# Patient Record
Sex: FEMALE | Race: WHITE | NOT HISPANIC OR LATINO | Employment: OTHER | URBAN - METROPOLITAN AREA
[De-identification: names, ages, dates, MRNs, and addresses within clinical notes are randomized per-mention and may not be internally consistent; named-entity substitution may affect disease eponyms.]

---

## 2019-02-04 ENCOUNTER — TELEPHONE (OUTPATIENT)
Dept: ENDOCRINOLOGY | Facility: HOSPITAL | Age: 79
End: 2019-02-04

## 2019-02-04 NOTE — TELEPHONE ENCOUNTER
Previous [facility] patient has appt with you on 2/13  She wants us to fax a lab slip to Dion [facility]ara in [facility]  She does not have the old one but said she see's you because she had part of her Thyroid removed  Do you know what labs you want to order?

## 2019-02-05 DIAGNOSIS — E89.0 POSTOPERATIVE HYPOTHYROIDISM: Primary | ICD-10-CM

## 2019-02-08 LAB
T4 FREE SERPL-MCNC: 1.82 NG/DL (ref 0.82–1.77)
THYROGLOB AB SERPL-ACNC: <1 IU/ML (ref 0–0.9)
THYROPEROXIDASE AB SERPL-ACNC: 22 IU/ML (ref 0–34)
TSH SERPL DL<=0.005 MIU/L-ACNC: 0.55 UIU/ML (ref 0.45–4.5)

## 2019-02-13 ENCOUNTER — OFFICE VISIT (OUTPATIENT)
Dept: ENDOCRINOLOGY | Facility: HOSPITAL | Age: 79
End: 2019-02-13
Payer: COMMERCIAL

## 2019-02-13 VITALS
BODY MASS INDEX: 47.09 KG/M2 | HEIGHT: 66 IN | DIASTOLIC BLOOD PRESSURE: 78 MMHG | WEIGHT: 293 LBS | SYSTOLIC BLOOD PRESSURE: 132 MMHG | HEART RATE: 54 BPM

## 2019-02-13 DIAGNOSIS — E89.0 POSTOPERATIVE HYPOTHYROIDISM: Primary | ICD-10-CM

## 2019-02-13 DIAGNOSIS — Z86.39 HISTORY OF THYROID NODULE: ICD-10-CM

## 2019-02-13 PROCEDURE — 99214 OFFICE O/P EST MOD 30 MIN: CPT | Performed by: INTERNAL MEDICINE

## 2019-02-13 RX ORDER — AMLODIPINE BESYLATE 2.5 MG/1
TABLET ORAL 2 TIMES DAILY
Refills: 3 | COMMUNITY
Start: 2019-01-01 | End: 2022-06-16

## 2019-02-13 RX ORDER — TRAMADOL HYDROCHLORIDE 50 MG/1
50 TABLET ORAL EVERY 6 HOURS PRN
COMMUNITY
End: 2021-11-04 | Stop reason: SDUPTHER

## 2019-02-13 RX ORDER — ATORVASTATIN CALCIUM 20 MG/1
20 TABLET, FILM COATED ORAL
Refills: 3 | COMMUNITY
Start: 2018-12-19

## 2019-02-13 RX ORDER — LISINOPRIL 20 MG/1
20 TABLET ORAL 2 TIMES DAILY
COMMUNITY
End: 2022-06-16

## 2019-02-13 RX ORDER — OMEPRAZOLE 20 MG/1
20 CAPSULE, DELAYED RELEASE ORAL DAILY
COMMUNITY
End: 2021-12-07

## 2019-02-13 RX ORDER — FUROSEMIDE 20 MG/1
20 TABLET ORAL DAILY
COMMUNITY
End: 2022-06-16

## 2019-02-13 RX ORDER — BISOPROLOL FUMARATE AND HYDROCHLOROTHIAZIDE 2.5; 6.25 MG/1; MG/1
1 TABLET ORAL DAILY
COMMUNITY
End: 2021-11-04

## 2019-02-13 RX ORDER — LEVOTHYROXINE SODIUM 137 MCG
137 TABLET ORAL DAILY
Refills: 4 | COMMUNITY
Start: 2018-12-25 | End: 2019-02-13 | Stop reason: ALTCHOICE

## 2019-02-13 RX ORDER — MONTELUKAST SODIUM 10 MG/1
10 TABLET ORAL
COMMUNITY
End: 2020-12-21

## 2019-02-13 RX ORDER — SPIRONOLACTONE 25 MG/1
25 TABLET ORAL DAILY
COMMUNITY
End: 2021-11-04

## 2019-02-13 RX ORDER — LEVOTHYROXINE SODIUM 137 UG/1
137 TABLET ORAL DAILY
Qty: 90 TABLET | Refills: 3 | Status: SHIPPED | OUTPATIENT
Start: 2019-02-13 | End: 2020-02-18 | Stop reason: SDUPTHER

## 2019-02-13 NOTE — PROGRESS NOTES
2/13/2019    Assessment/Plan      Diagnoses and all orders for this visit:    Postoperative hypothyroidism  -     TSH, 3rd generation Lab Collect; Future  -     T4, free Lab Collect; Future  -     levothyroxine 137 mcg tablet; Take 1 tablet (137 mcg total) by mouth daily    History of thyroid nodule  -     TSH, 3rd generation Lab Collect; Future  -     T4, free Lab Collect; Future  -     levothyroxine 137 mcg tablet; Take 1 tablet (137 mcg total) by mouth daily    Other orders  -     amLODIPine (NORVASC) 2 5 mg tablet; 1 (ONE) TABLET daily  -     aspirin 81 MG tablet; Take 81 mg by mouth daily  -     furosemide (LASIX) 20 mg tablet; Take 20 mg by mouth daily   -     omeprazole (PriLOSEC) 20 mg delayed release capsule; Take 20 mg by mouth daily  -     montelukast (SINGULAIR) 10 mg tablet; Take 10 mg by mouth daily at bedtime  -     spironolactone (ALDACTONE) 25 mg tablet; Take 25 mg by mouth daily  -     Discontinue: SYNTHROID 137 MCG tablet; Take 137 mcg by mouth daily  -     atorvastatin (LIPITOR) 10 mg tablet; Take 10 mg by mouth daily at bedtime  -     Oxygen-Helium (RES-Q-AIR IN); Inhale  -     traMADol (ULTRAM) 50 mg tablet; Take 50 mg by mouth every 6 (six) hours as needed for moderate pain  -     lisinopril (ZESTRIL) 20 mg tablet; Take 20 mg by mouth daily  -     bisoprolol-hydrochlorothiazide (ZIAC) 2 5-6 25 MG per tablet; Take 1 tablet by mouth daily        Assessment/Plan:  Hypothyroidism post thyroidectomy  Her most recent thyroid function tests are normal   She is biochemically euthyroid  She will continue the same dosage of thyroid hormone but will try switching to generic levothyroxine for cost issues and continue 137 mcg daily  She has a history of compressive thyroid symptoms due to her enlarged thyroid and thyroid nodules prior to the surgery and has no issues with swallowing at this point    I have asked her to follow up in 1 year with preceding TSH and free T4       CC:  Hypothyroid follow-up    History of Present Illness     HPI: Chandu Reis is a 69y o  year old female with history of hypothyroidism post subtotal thyroidectomy  She was found to have an enlarged goiter and thyroid nodules in 2008  She was having troubles with feeling choking sensation when bent over forward  The thyroid nodules were biopsy benign  She underwent subtotal thyroidectomy 04/10/2008 due to continued growth and compressive thyroid symptoms  She is taking brand synthroid 137 mcg daily  She does tend always be cold  She denies heat intolerance, tremors, anxiety or depression, or diarrhea  She does have chronic constipation  She has occasional heart racing  She denies insomnia but is fatigued a lot  She has lost 11 lb since November of 2017  She has dry skin and brittle nails, but no hair loss  She has no diplopia  She has no compressive thyroid symptoms or difficulties with swallowing  Review of Systems   Constitutional: Positive for fatigue  Negative for unexpected weight change  Can have fatigue at times depending on activity  11 lbs less than November 2017  HENT: Positive for tinnitus  Negative for hearing loss and trouble swallowing  Has a lot of sinus problems  No XRT to head or neck in the past  Occasional tinnitus  Eyes: Negative for visual disturbance  No diplopia  Wears reading glasses  Respiratory: Positive for shortness of breath  Negative for chest tightness  SOB with activity  Cardiovascular: Positive for palpitations  Negative for chest pain and leg swelling  Occasional heart racing  Gastrointestinal: Positive for constipation  Negative for abdominal pain, diarrhea and nausea  Endocrine: Positive for cold intolerance  Negative for heat intolerance  Musculoskeletal: Positive for back pain  Negative for arthralgias  Has chronic low back pain  Walks with a walker for stability  Skin: Negative for rash  Has dry skin   Has brittle nails  No hair loss  Neurological: Positive for numbness  Negative for dizziness, tremors, light-headedness and headaches  Has numbness of hands if hold an object too long  Psychiatric/Behavioral: Negative for dysphoric mood and sleep disturbance  The patient is not nervous/anxious  Sleeps in a chair at night  Depressing over youngest son's issues         Historical Information   Past Medical History:   Diagnosis Date    Asthma     GERD (gastroesophageal reflux disease)     Heart murmur     Hyperlipidemia     Hypertension     Osteoarthritis     Spinal stenosis      Past Surgical History:   Procedure Laterality Date    LAPAROSCOPIC CHOLECYSTECTOMY      REDUCTION MAMMAPLASTY      REPLACEMENT TOTAL KNEE BILATERAL Bilateral     THYROIDECTOMY  2008    TOTAL ABDOMINAL HYSTERECTOMY W/ BILATERAL SALPINGOOPHORECTOMY      with appendectomy    TOTAL HIP ARTHROPLASTY Bilateral     TUBAL LIGATION Bilateral      Social History   Social History     Substance and Sexual Activity   Alcohol Use Yes    Frequency: Monthly or less    Comment: once a year     Social History     Substance and Sexual Activity   Drug Use Never     Social History     Tobacco Use   Smoking Status Never Smoker   Smokeless Tobacco Never Used     Family History:   Family History   Problem Relation Age of Onset    Heart disease Mother     Diabetes type II Mother     Hypotension Father     Lung disease Father         from chemicals    Diabetes type II Brother     Lung cancer Daughter     Brain cancer Daughter     Kidney cancer Daughter     No Known Problems Son     Neurological problems Son    Soraida Marvmerry Anxiety disorder Son     Thyroid disease unspecified Daughter        Meds/Allergies   Current Outpatient Medications   Medication Sig Dispense Refill    amLODIPine (NORVASC) 2 5 mg tablet 1 (ONE) TABLET daily  3    aspirin 81 MG tablet Take 81 mg by mouth daily      atorvastatin (LIPITOR) 10 mg tablet Take 10 mg by mouth daily at bedtime  3    bisoprolol-hydrochlorothiazide (ZIAC) 2 5-6 25 MG per tablet Take 1 tablet by mouth daily      furosemide (LASIX) 20 mg tablet Take 20 mg by mouth daily       lisinopril (ZESTRIL) 20 mg tablet Take 20 mg by mouth daily      montelukast (SINGULAIR) 10 mg tablet Take 10 mg by mouth daily at bedtime      omeprazole (PriLOSEC) 20 mg delayed release capsule Take 20 mg by mouth daily      Oxygen-Helium (RES-Q-AIR IN) Inhale      spironolactone (ALDACTONE) 25 mg tablet Take 25 mg by mouth daily      traMADol (ULTRAM) 50 mg tablet Take 50 mg by mouth every 6 (six) hours as needed for moderate pain      levothyroxine 137 mcg tablet Take 1 tablet (137 mcg total) by mouth daily 90 tablet 3     No current facility-administered medications for this visit  No Known Allergies    Objective   Vitals: Blood pressure 132/78, pulse (!) 54, height 5' 6" (1 676 m), weight (!) 137 kg (301 lb 12 8 oz)  Invasive Devices          None          Physical Exam   Constitutional: She is oriented to person, place, and time  She appears well-developed and well-nourished  HENT:   Head: Normocephalic and atraumatic  Eyes: Pupils are equal, round, and reactive to light  Conjunctivae and EOM are normal    No lid lag, stare, proptosis, or periorbital edema  Neck: Normal range of motion  Neck supple  Healed anterior neck scar  No palpable thyroid tissue  No bruits over the carotids  Cardiovascular: Normal rate, regular rhythm and intact distal pulses  Murmur heard  2/6 systolic murmur  Pulmonary/Chest: Effort normal and breath sounds normal  She has no wheezes  Abdominal: Soft  Bowel sounds are normal  There is no tenderness  Musculoskeletal: Normal range of motion  She exhibits edema  She exhibits no deformity  Trace to 1+ edema  No tremor of the outstretched hands  No spinous process tenderness  No CVA tenderness  Lymphadenopathy:     She has no cervical adenopathy     Neurological: She is alert and oriented to person, place, and time  She has normal reflexes  Deep tendon reflexes normal without briskness  Skin: Skin is warm and dry  No rash noted  Vitals reviewed  The history was obtained from the review of the chart, [facility] notes, and from the patient  Lab Results:      Blood work done on 02/07/2019 demonstrates the following results:    Lab Results   Component Value Date    TSH 0 551 02/07/2019    FREET4 1 82 (H) 02/07/2019    Thyroid microsomal antibodies were 22  Antithyroglobulin antibodies are less than 1         Future Appointments   Date Time Provider Laura Montoya   2/18/2020  1:00 PM Veneda Bernheim, MD [facility]

## 2019-02-13 NOTE — PATIENT INSTRUCTIONS
The thyroid blood work is normal   Continue the same dose of thyroid hormone but switch to generic to save money  Follow up in 1 year with blood work

## 2019-02-13 NOTE — LETTER
February 13, 2019     Charlie Dominguez, [address] [facility]  [address] [address]    Patient: Gato Nava   YOB: 1940   Date of Visit: 2/13/2019       Dear Dr Lamont Andersen:    Thank you for referring Marshall Jamison to me for evaluation  Below are my notes for this consultation  If you have questions, please do not hesitate to call me  I look forward to following your patient along with you  Sincerely,        Mello Agarwal MD        CC: No Recipients  Mello Agarwal MD  2/13/2019  3:21 PM  Sign at close encounter  2/13/2019    Assessment/Plan      Diagnoses and all orders for this visit:    Postoperative hypothyroidism  -     TSH, 3rd generation Lab Collect; Future  -     T4, free Lab Collect; Future  -     levothyroxine 137 mcg tablet; Take 1 tablet (137 mcg total) by mouth daily    History of thyroid nodule  -     TSH, 3rd generation Lab Collect; Future  -     T4, free Lab Collect; Future  -     levothyroxine 137 mcg tablet; Take 1 tablet (137 mcg total) by mouth daily    Other orders  -     amLODIPine (NORVASC) 2 5 mg tablet; 1 (ONE) TABLET daily  -     aspirin 81 MG tablet; Take 81 mg by mouth daily  -     furosemide (LASIX) 20 mg tablet; Take 20 mg by mouth daily   -     omeprazole (PriLOSEC) 20 mg delayed release capsule; Take 20 mg by mouth daily  -     montelukast (SINGULAIR) 10 mg tablet; Take 10 mg by mouth daily at bedtime  -     spironolactone (ALDACTONE) 25 mg tablet; Take 25 mg by mouth daily  -     Discontinue: SYNTHROID 137 MCG tablet; Take 137 mcg by mouth daily  -     atorvastatin (LIPITOR) 10 mg tablet; Take 10 mg by mouth daily at bedtime  -     Oxygen-Helium (RES-Q-AIR IN); Inhale  -     traMADol (ULTRAM) 50 mg tablet; Take 50 mg by mouth every 6 (six) hours as needed for moderate pain  -     lisinopril (ZESTRIL) 20 mg tablet; Take 20 mg by mouth daily  -     bisoprolol-hydrochlorothiazide (ZIAC) 2 5-6 25 MG per tablet;  Take 1 tablet by mouth daily        Assessment/Plan:  Hypothyroidism post thyroidectomy  Her most recent thyroid function tests are normal   She is biochemically euthyroid  She will continue the same dosage of thyroid hormone but will try switching to generic levothyroxine for cost issues and continue 137 mcg daily  She has a history of compressive thyroid symptoms due to her enlarged thyroid and thyroid nodules prior to the surgery and has no issues with swallowing at this point  I have asked her to follow up in 1 year with preceding TSH and free T4       CC:  Hypothyroid follow-up    History of Present Illness     HPI: Alireza Alexander is a 69y o  year old female with history of hypothyroidism post subtotal thyroidectomy  She was found to have an enlarged goiter and thyroid nodules in 2008  She was having troubles with feeling choking sensation when bent over forward  The thyroid nodules were biopsy benign  She underwent subtotal thyroidectomy 04/10/2008 due to continued growth and compressive thyroid symptoms  She is taking brand synthroid 137 mcg daily  She does tend always be cold  She denies heat intolerance, tremors, anxiety or depression, or diarrhea  She does have chronic constipation  She has occasional heart racing  She denies insomnia but is fatigued a lot  She has lost 11 lb since November of 2017  She has dry skin and brittle nails, but no hair loss  She has no diplopia  She has no compressive thyroid symptoms or difficulties with swallowing  Review of Systems   Constitutional: Positive for fatigue  Negative for unexpected weight change  Can have fatigue at times depending on activity  11 lbs less than November 2017  HENT: Positive for tinnitus  Negative for hearing loss and trouble swallowing  Has a lot of sinus problems  No XRT to head or neck in the past  Occasional tinnitus  Eyes: Negative for visual disturbance  No diplopia  Wears reading glasses     Respiratory: Positive for shortness of breath  Negative for chest tightness  SOB with activity  Cardiovascular: Positive for palpitations  Negative for chest pain and leg swelling  Occasional heart racing  Gastrointestinal: Positive for constipation  Negative for abdominal pain, diarrhea and nausea  Endocrine: Positive for cold intolerance  Negative for heat intolerance  Musculoskeletal: Positive for back pain  Negative for arthralgias  Has chronic low back pain  Walks with a walker for stability  Skin: Negative for rash  Has dry skin  Has brittle nails  No hair loss  Neurological: Positive for numbness  Negative for dizziness, tremors, light-headedness and headaches  Has numbness of hands if hold an object too long  Psychiatric/Behavioral: Negative for dysphoric mood and sleep disturbance  The patient is not nervous/anxious  Sleeps in a chair at night  Depressing over youngest son's issues         Historical Information   Past Medical History:   Diagnosis Date    Asthma     GERD (gastroesophageal reflux disease)     Heart murmur     Hyperlipidemia     Hypertension     Osteoarthritis     Spinal stenosis      Past Surgical History:   Procedure Laterality Date    LAPAROSCOPIC CHOLECYSTECTOMY      REDUCTION MAMMAPLASTY      REPLACEMENT TOTAL KNEE BILATERAL Bilateral     THYROIDECTOMY  2008    TOTAL ABDOMINAL HYSTERECTOMY W/ BILATERAL SALPINGOOPHORECTOMY      with appendectomy    TOTAL HIP ARTHROPLASTY Bilateral     TUBAL LIGATION Bilateral      Social History   Social History     Substance and Sexual Activity   Alcohol Use Yes    Frequency: Monthly or less    Comment: once a year     Social History     Substance and Sexual Activity   Drug Use Never     Social History     Tobacco Use   Smoking Status Never Smoker   Smokeless Tobacco Never Used     Family History:   Family History   Problem Relation Age of Onset    Heart disease Mother     Diabetes type II Mother    Nicky Belrupesh Hypotension Father     Lung disease Father         from chemicals    Diabetes type II Brother     Lung cancer Daughter     Brain cancer Daughter     Kidney cancer Daughter     No Known Problems Son     Neurological problems Son    Luis M Bell Anxiety disorder Son     Thyroid disease unspecified Daughter        Meds/Allergies   Current Outpatient Medications   Medication Sig Dispense Refill    amLODIPine (NORVASC) 2 5 mg tablet 1 (ONE) TABLET daily  3    aspirin 81 MG tablet Take 81 mg by mouth daily      atorvastatin (LIPITOR) 10 mg tablet Take 10 mg by mouth daily at bedtime  3    bisoprolol-hydrochlorothiazide (ZIAC) 2 5-6 25 MG per tablet Take 1 tablet by mouth daily      furosemide (LASIX) 20 mg tablet Take 20 mg by mouth daily       lisinopril (ZESTRIL) 20 mg tablet Take 20 mg by mouth daily      montelukast (SINGULAIR) 10 mg tablet Take 10 mg by mouth daily at bedtime      omeprazole (PriLOSEC) 20 mg delayed release capsule Take 20 mg by mouth daily      Oxygen-Helium (RES-Q-AIR IN) Inhale      spironolactone (ALDACTONE) 25 mg tablet Take 25 mg by mouth daily      traMADol (ULTRAM) 50 mg tablet Take 50 mg by mouth every 6 (six) hours as needed for moderate pain      levothyroxine 137 mcg tablet Take 1 tablet (137 mcg total) by mouth daily 90 tablet 3     No current facility-administered medications for this visit  No Known Allergies    Objective   Vitals: Blood pressure 132/78, pulse (!) 54, height 5' 6" (1 676 m), weight (!) 137 kg (301 lb 12 8 oz)  Invasive Devices          None          Physical Exam   Constitutional: She is oriented to person, place, and time  She appears well-developed and well-nourished  HENT:   Head: Normocephalic and atraumatic  Eyes: Pupils are equal, round, and reactive to light  Conjunctivae and EOM are normal    No lid lag, stare, proptosis, or periorbital edema  Neck: Normal range of motion  Neck supple  Healed anterior neck scar    No palpable thyroid tissue  No bruits over the carotids  Cardiovascular: Normal rate, regular rhythm and intact distal pulses  Murmur heard  2/6 systolic murmur  Pulmonary/Chest: Effort normal and breath sounds normal  She has no wheezes  Abdominal: Soft  Bowel sounds are normal  There is no tenderness  Musculoskeletal: Normal range of motion  She exhibits edema  She exhibits no deformity  Trace to 1+ edema  No tremor of the outstretched hands  No spinous process tenderness  No CVA tenderness  Lymphadenopathy:     She has no cervical adenopathy  Neurological: She is alert and oriented to person, place, and time  She has normal reflexes  Deep tendon reflexes normal without briskness  Skin: Skin is warm and dry  No rash noted  Vitals reviewed  The history was obtained from the review of the chart, [facility] notes, and from the patient  Lab Results:      Blood work done on 02/07/2019 demonstrates the following results:    Lab Results   Component Value Date    TSH 0 551 02/07/2019    FREET4 1 82 (H) 02/07/2019    Thyroid microsomal antibodies were 22  Antithyroglobulin antibodies are less than 1         Future Appointments   Date Time Provider Laura Montoya   2/18/2020  1:00 PM Martin Cartwright MD [facility]

## 2019-04-02 DIAGNOSIS — E89.0 POSTOPERATIVE HYPOTHYROIDISM: ICD-10-CM

## 2019-04-02 DIAGNOSIS — Z86.39 HISTORY OF THYROID NODULE: ICD-10-CM

## 2019-04-02 RX ORDER — LEVOTHYROXINE SODIUM 137 MCG
TABLET ORAL
Qty: 90 TABLET | Refills: 4 | OUTPATIENT
Start: 2019-04-02

## 2020-02-14 LAB
T4 FREE SERPL-MCNC: 1.9 NG/DL (ref 0.82–1.77)
TSH SERPL DL<=0.005 MIU/L-ACNC: 1.67 UIU/ML (ref 0.45–4.5)

## 2020-02-18 ENCOUNTER — OFFICE VISIT (OUTPATIENT)
Dept: ENDOCRINOLOGY | Facility: HOSPITAL | Age: 80
End: 2020-02-18
Payer: COMMERCIAL

## 2020-02-18 VITALS
HEIGHT: 66 IN | BODY MASS INDEX: 47.09 KG/M2 | HEART RATE: 64 BPM | SYSTOLIC BLOOD PRESSURE: 132 MMHG | WEIGHT: 293 LBS | DIASTOLIC BLOOD PRESSURE: 80 MMHG

## 2020-02-18 DIAGNOSIS — Z86.39 HISTORY OF THYROID NODULE: ICD-10-CM

## 2020-02-18 DIAGNOSIS — E89.0 POSTOPERATIVE HYPOTHYROIDISM: Primary | ICD-10-CM

## 2020-02-18 PROCEDURE — 99213 OFFICE O/P EST LOW 20 MIN: CPT | Performed by: INTERNAL MEDICINE

## 2020-02-18 RX ORDER — LEVOTHYROXINE SODIUM 137 UG/1
137 TABLET ORAL DAILY
Qty: 90 TABLET | Refills: 3 | Status: SHIPPED | OUTPATIENT
Start: 2020-02-18 | End: 2021-02-15 | Stop reason: SDUPTHER

## 2020-02-18 NOTE — PROGRESS NOTES
2/18/2020    Assessment/Plan      Diagnoses and all orders for this visit:    Postoperative hypothyroidism  -     TSH, 3rd generation Lab Collect; Future  -     T4, free Lab Collect; Future  -     levothyroxine 137 mcg tablet; Take 1 tablet (137 mcg total) by mouth daily    History of thyroid nodule  -     levothyroxine 137 mcg tablet; Take 1 tablet (137 mcg total) by mouth daily    Other orders  -     Omega-3 Fatty Acids (FISH OIL CONCENTRATE PO); Take 1,250 mg by mouth 2 (two) times a day Res q 1250 fish oil 2 tablets twice a day  Assessment/Plan:  Hypothyroidism post subtotal thyroidectomy  She has a history of thyroid nodules requiring removal via subtotal thyroidectomy  Most recent thyroid function tests are normal   She is both biochemically and clinically euthyroid  She will continue the same levothyroxine 137 mcg daily  She has been tolerating generic thyroid hormone without any change in symptoms  I have asked her to follow up in 1 year with preceding TSH and free T4       CC:  Hypothyroid follow-up    History of Present Illness     HPI: Jacque Munroe is a 67y o  year old female with history of hypothyroidism post subtotal thyroidectomy here for follow-up  She was found to have an enlarged goiter in thyroid nodules in 2008  She was having a choking sensation in her throat when she bent forward  Thyroid biopsies were benign but she underwent subtotal thyroidectomy April 2008 due to compressive thyroid symptoms and continued growth of the thyroid nodules  She has been on thyroid hormone replacement ever since  She is currently taking levothyroxine 137 mcg 1 tablet daily  She feels no different in symptoms with switch to generic  She denies heat or cold intolerance, anxiety or depression, diarrhea, palpitation, or tremors  She has some constipation  She is fatigued  She has dry skin but no brittle nails or hair loss  She has some intermittent insomnia  She denies diplopia    Weight is stable  She has no compressive thyroid symptoms or difficulties with swallowing  Review of Systems   Constitutional: Positive for fatigue  Negative for unexpected weight change  HENT: Negative for trouble swallowing  Eyes: Negative for visual disturbance  No diplopia  Respiratory: Positive for shortness of breath  Negative for chest tightness  SOB with activity  Cardiovascular: Negative for chest pain and palpitations  Gastrointestinal: Positive for constipation  Negative for abdominal pain, diarrhea and nausea  Some recent constipation  Endocrine: Negative for cold intolerance and heat intolerance  Musculoskeletal: Positive for back pain  Still a lot of back pain and uses PRN CBD oil and hemp  Has had a fall and is worse  Skin: Negative for rash  Has dry skin  No brittle nails  No hair loss  Neurological: Negative for dizziness, tremors, light-headedness and headaches  Psychiatric/Behavioral: Positive for sleep disturbance  Negative for dysphoric mood  The patient is not nervous/anxious  Sleeping variable         Historical Information   Past Medical History:   Diagnosis Date    Asthma     GERD (gastroesophageal reflux disease)     Heart murmur     Hyperlipidemia     Hypertension     Osteoarthritis     Spinal stenosis      Past Surgical History:   Procedure Laterality Date    LAPAROSCOPIC CHOLECYSTECTOMY      REDUCTION MAMMAPLASTY      REPLACEMENT TOTAL KNEE BILATERAL Bilateral     THYROIDECTOMY  2008    TOTAL ABDOMINAL HYSTERECTOMY W/ BILATERAL SALPINGOOPHORECTOMY      with appendectomy    TOTAL HIP ARTHROPLASTY Bilateral     TUBAL LIGATION Bilateral      Social History   Social History     Substance and Sexual Activity   Alcohol Use Yes    Frequency: Monthly or less    Comment: once a year     Social History     Substance and Sexual Activity   Drug Use Never    Comment: CBD oil and hemp PRn      Social History     Tobacco Use Smoking Status Never Smoker   Smokeless Tobacco Never Used     Family History:   Family History   Problem Relation Age of Onset    Heart disease Mother     Diabetes type II Mother     Hypotension Father     Lung disease Father         from chemicals    Diabetes type II Brother     Kidney failure Brother     Lung cancer Daughter     Brain cancer Daughter     Kidney cancer Daughter     No Known Problems Son     Neurological problems Son     Anxiety disorder Son     Thyroid disease unspecified Daughter        Meds/Allergies   Current Outpatient Medications   Medication Sig Dispense Refill    amLODIPine (NORVASC) 2 5 mg tablet 1 (ONE) TABLET daily  3    aspirin 81 MG tablet Take 81 mg by mouth daily      atorvastatin (LIPITOR) 10 mg tablet Take 10 mg by mouth daily at bedtime  3    bisoprolol-hydrochlorothiazide (ZIAC) 2 5-6 25 MG per tablet Take 1 tablet by mouth daily      furosemide (LASIX) 20 mg tablet Take 20 mg by mouth daily       levothyroxine 137 mcg tablet Take 1 tablet (137 mcg total) by mouth daily 90 tablet 3    lisinopril (ZESTRIL) 20 mg tablet Take 20 mg by mouth daily      montelukast (SINGULAIR) 10 mg tablet Take 10 mg by mouth daily at bedtime      Omega-3 Fatty Acids (FISH OIL CONCENTRATE PO) Take 1,250 mg by mouth 2 (two) times a day Res q 1250 fish oil 2 tablets twice a day   omeprazole (PriLOSEC) 20 mg delayed release capsule Take 20 mg by mouth daily      spironolactone (ALDACTONE) 25 mg tablet Take 25 mg by mouth daily      traMADol (ULTRAM) 50 mg tablet Take 50 mg by mouth every 6 (six) hours as needed for moderate pain       No current facility-administered medications for this visit  No Known Allergies    Objective   Vitals: Blood pressure 132/80, pulse 64, height 5' 6" (1 676 m), weight (!) 137 kg (301 lb 9 6 oz)  Invasive Devices     None                 Physical Exam   Constitutional: She is oriented to person, place, and time   She appears well-developed and well-nourished  HENT:   Head: Normocephalic and atraumatic  Eyes: Pupils are equal, round, and reactive to light  Conjunctivae and EOM are normal    No lid lag, stare, proptosis, or periorbital edema  Neck: Normal range of motion  Neck supple  Healed anterior neck scar  No palpable thyroid tissue  No carotid bruits  Cardiovascular: Normal rate and regular rhythm  No murmur heard  2/6 systolic heart murmur  Pulmonary/Chest: Effort normal and breath sounds normal  She has no wheezes  Musculoskeletal: She exhibits edema  She exhibits no deformity  1-2+ edema of the bilateral lower extremities  No tremor of the outstretched hands  Lymphadenopathy:     She has no cervical adenopathy  Neurological: She is alert and oriented to person, place, and time  She has normal reflexes  Diminished deep tendon reflexes  Skin: Skin is warm and dry  No rash noted  Vitals reviewed  The history was obtained from the review of the chart and from the patient      Lab Results:      Blood work done on 02/13/2020 demonstrated the following results:    Lab Results   Component Value Date    TSH 1 670 02/13/2020    FREET4 1 90 (H) 02/13/2020       Future Appointments   Date Time Provider Laura Montoya   2/22/2021  1:00 PM Rajat Alexander MD [facility]

## 2020-02-18 NOTE — PATIENT INSTRUCTIONS
The thyroid blood work is normal   Continue the same levothyroxine 137 mcg daily  follow up in 1 year with blood work

## 2020-12-21 DIAGNOSIS — J45.40 MODERATE PERSISTENT ASTHMA WITHOUT COMPLICATION: Primary | ICD-10-CM

## 2020-12-21 RX ORDER — MONTELUKAST SODIUM 10 MG/1
TABLET ORAL
Qty: 90 TABLET | Refills: 3 | Status: SHIPPED | OUTPATIENT
Start: 2020-12-21 | End: 2021-11-04 | Stop reason: SDUPTHER

## 2021-02-15 ENCOUNTER — TELEPHONE (OUTPATIENT)
Dept: ENDOCRINOLOGY | Facility: HOSPITAL | Age: 81
End: 2021-02-15

## 2021-02-15 DIAGNOSIS — Z86.39 HISTORY OF THYROID NODULE: ICD-10-CM

## 2021-02-15 DIAGNOSIS — E89.0 POSTOPERATIVE HYPOTHYROIDISM: ICD-10-CM

## 2021-02-15 RX ORDER — LEVOTHYROXINE SODIUM 137 UG/1
137 TABLET ORAL DAILY
Qty: 30 TABLET | Refills: 3 | Status: SHIPPED | OUTPATIENT
Start: 2021-02-15 | End: 2021-02-18

## 2021-02-15 NOTE — TELEPHONE ENCOUNTER
Pt rescheduled 2/22 to 4/7  She had death in family & not able to get labs done   She needs renewal of levothyroxine/ 137mcg/ once daily/ she would like just a 30 day supply/ send to [facility] #[id number]

## 2021-02-18 DIAGNOSIS — E89.0 POSTOPERATIVE HYPOTHYROIDISM: ICD-10-CM

## 2021-02-18 DIAGNOSIS — Z86.39 HISTORY OF THYROID NODULE: ICD-10-CM

## 2021-02-18 RX ORDER — LEVOTHYROXINE SODIUM 137 UG/1
TABLET ORAL
Qty: 90 TABLET | Refills: 3 | Status: SHIPPED | OUTPATIENT
Start: 2021-02-18 | End: 2022-01-24

## 2021-03-25 LAB
T4 FREE SERPL-MCNC: 1.89 NG/DL (ref 0.82–1.77)
TSH SERPL DL<=0.005 MIU/L-ACNC: 1.48 UIU/ML (ref 0.45–4.5)

## 2021-04-07 ENCOUNTER — OFFICE VISIT (OUTPATIENT)
Dept: ENDOCRINOLOGY | Facility: HOSPITAL | Age: 81
End: 2021-04-07
Payer: COMMERCIAL

## 2021-04-07 VITALS
HEART RATE: 60 BPM | HEIGHT: 66 IN | BODY MASS INDEX: 47.09 KG/M2 | WEIGHT: 293 LBS | SYSTOLIC BLOOD PRESSURE: 144 MMHG | DIASTOLIC BLOOD PRESSURE: 78 MMHG

## 2021-04-07 DIAGNOSIS — Z86.39 HISTORY OF THYROID NODULE: ICD-10-CM

## 2021-04-07 DIAGNOSIS — E89.0 POSTOPERATIVE HYPOTHYROIDISM: Primary | ICD-10-CM

## 2021-04-07 PROCEDURE — 99213 OFFICE O/P EST LOW 20 MIN: CPT | Performed by: INTERNAL MEDICINE

## 2021-04-07 RX ORDER — METOPROLOL SUCCINATE 25 MG/1
25 TABLET, EXTENDED RELEASE ORAL DAILY
COMMUNITY

## 2021-04-07 NOTE — PROGRESS NOTES
4/7/2021    Assessment/Plan      Diagnoses and all orders for this visit:    Postoperative hypothyroidism  -     TSH, 3rd generation Lab Collect; Future  -     T4, free Lab Collect; Future    History of thyroid nodule  -     TSH, 3rd generation Lab Collect; Future  -     T4, free Lab Collect; Future    Other orders  -     Oxygen-Helium (RES-Q-AIR IN); Inhale  -     metoprolol succinate (TOPROL-XL) 25 mg 24 hr tablet; Take 25 mg by mouth daily        Assessment/Plan:    Hypothyroidism post thyroidectomy  Most recent thyroid function tests are normal   She is both clinically and biochemically euthyroid  She will continue the same levothyroxine 137 mcg daily  I have asked her to follow up in 1 year with preceding TSH and free T4       CC:   Hypothyroid follow-up    History of Present Illness     HPI: Deyanira Gasca is a 79y o  year old female with history of Hypothyroidism post subtotal thyroidectomy here for follow-up visit  She was found to have a large goiter in and thyroid nodules in 2008  Since she was having choking sensation in her throat when she bent followed, surgery was performed  Of note thyroid nodule biopsies done via fine-needle aspiration were benign  Subtotal Thyroidectomy was done in April 2008 due to compressive thyroid symptoms and continued growth of the thyroid nodules  She has been on thyroid hormone for replacement purposes as the pathology postoperatively was benign  She is currently taking levothyroxine 137 mcg daily  Weight is 15 lb more than  Last year  She though feels as if she has lost weight and not gained weight since last year  She is fatigued  She has feelings of being cold all the time  Sleeping was poor when her son was alive but has improved since he has passed away although she is more emotional since he has passed  She has dry skin but no brittle nails or hair loss  She has worsening tremors  She denies palpitations    She has constipation at times but no diarrhea  She denies diplopia  She denies compressive thyroid symptoms or difficulties with swallowing  Review of Systems   Constitutional: Positive for fatigue and unexpected weight change  Weight gain 15 lb since last year  Feels lost weight,not gained  HENT: Negative for trouble swallowing  Eyes: Negative for visual disturbance  No diplopia  Respiratory: Positive for shortness of breath  Negative for chest tightness  SOB with activity, this is worsening  Cardiovascular: Negative for chest pain and palpitations  Has aortic valve narrowing more and to have repeat echo in sept 2021  Gastrointestinal: Positive for constipation  Negative for abdominal pain, diarrhea and nausea  Constipation at times  Endocrine: Positive for cold intolerance  Negative for heat intolerance  Skin: Negative for rash  Has dry skin  No brittle nails  No hair loss  Neurological: Positive for tremors  Negative for dizziness, light-headedness and headaches  Some worsening tremors  occasional headaches  Psychiatric/Behavioral: Positive for sleep disturbance  Negative for dysphoric mood  The patient is not nervous/anxious  Sleeping was poor when son was alive, it is improving now since he passed 2/14/2021  Dealing with emotions with sons death and estate         Historical Information   Past Medical History:   Diagnosis Date    Aortic stenosis     Asthma     GERD (gastroesophageal reflux disease)     Heart murmur     Hyperlipidemia     Hypertension     Osteoarthritis     Spinal stenosis      Past Surgical History:   Procedure Laterality Date    LAPAROSCOPIC CHOLECYSTECTOMY      REDUCTION MAMMAPLASTY      REPLACEMENT TOTAL KNEE BILATERAL Bilateral     THYROIDECTOMY  2008    TOTAL ABDOMINAL HYSTERECTOMY W/ BILATERAL SALPINGOOPHORECTOMY      with appendectomy    TOTAL HIP ARTHROPLASTY Bilateral     TUBAL LIGATION Bilateral      Social History   Social History     Substance and Sexual Activity   Alcohol Use Yes    Frequency: Monthly or less    Comment: once a year     Social History     Substance and Sexual Activity   Drug Use Never    Comment: CBD oil and hemp PRn      Social History     Tobacco Use   Smoking Status Never Smoker   Smokeless Tobacco Never Used     Family History:   Family History   Problem Relation Age of Onset    Heart disease Mother     Diabetes type II Mother     Hypotension Father     Lung disease Father         from chemicals    Diabetes type II Brother     Kidney failure Brother     Lung cancer Daughter     Brain cancer Daughter     Kidney cancer Daughter     No Known Problems Son     Neurological problems Son     Anxiety disorder Son     Thyroid disease unspecified Daughter        Meds/Allergies   Current Outpatient Medications   Medication Sig Dispense Refill    amLODIPine (NORVASC) 2 5 mg tablet 2 (two) times a day   3    aspirin 81 MG tablet Take 81 mg by mouth daily      atorvastatin (LIPITOR) 20 mg tablet Take 20 mg by mouth daily at bedtime   3    furosemide (LASIX) 20 mg tablet Take 20 mg by mouth daily       levothyroxine 137 mcg tablet TAKE 1 TABLET BY MOUTH EVERY DAY 90 tablet 3    lisinopril (ZESTRIL) 20 mg tablet Take 20 mg by mouth 2 (two) times a day       metoprolol succinate (TOPROL-XL) 25 mg 24 hr tablet Take 25 mg by mouth daily      montelukast (SINGULAIR) 10 mg tablet TAKE 1 TABLET BY MOUTH EVERY DAY 90 tablet 3    Omega-3 Fatty Acids (FISH OIL CONCENTRATE PO) Take 1,250 mg by mouth 2 (two) times a day Res q 1250 fish oil 2 tablets twice a day        omeprazole (PriLOSEC) 20 mg delayed release capsule Take 20 mg by mouth daily      Oxygen-Helium (RES-Q-AIR IN) Inhale      spironolactone (ALDACTONE) 25 mg tablet Take 25 mg by mouth daily      bisoprolol-hydrochlorothiazide (ZIAC) 2 5-6 25 MG per tablet Take 1 tablet by mouth daily      traMADol (ULTRAM) 50 mg tablet Take 50 mg by mouth every 6 (six) hours as needed for moderate pain       No current facility-administered medications for this visit  No Known Allergies    Objective   Vitals: Blood pressure 144/78, pulse 60, height 5' 6" (1 676 m), weight (!) 143 kg (316 lb 3 2 oz)  Invasive Devices     None                 Physical Exam  Vitals signs reviewed  Constitutional:       Appearance: Normal appearance  She is well-developed  HENT:      Head: Normocephalic and atraumatic  Eyes:      Extraocular Movements: Extraocular movements intact  Conjunctiva/sclera: Conjunctivae normal       Comments: No lid lag, stare, proptosis, or periorbital edema  Neck:      Musculoskeletal: Normal range of motion and neck supple  Thyroid: No thyromegaly  Vascular: No carotid bruit  Comments: Healed anterior neck scar  No palpable thyroid tissue  Cardiovascular:      Rate and Rhythm: Normal rate and regular rhythm  Heart sounds: Normal heart sounds  No murmur  Comments: 2/6 Blowing holosystolic murmur with radiation to the carotids  Pulmonary:      Effort: Pulmonary effort is normal       Breath sounds: Normal breath sounds  No wheezing  Abdominal:      Palpations: Abdomen is soft  Musculoskeletal: Normal range of motion  General: No deformity  Right lower leg: Edema present  Left lower leg: Edema present  Comments: 1-2+ Bilateral lower extremity edema  Slight fine tremor of the outstretched hands  Lymphadenopathy:      Cervical: No cervical adenopathy  Skin:     General: Skin is warm and dry  Findings: No rash  Neurological:      Mental Status: She is alert and oriented to person, place, and time  Deep Tendon Reflexes: Reflexes are normal and symmetric  Comments: Deep tendon reflexes diminished at the patellar area  The history was obtained from the review of the chart and from the patient      Lab Results:      Blood work done on 3/24/2021 demonstrated the following results:    Lab Results   Component Value Date    TSH 1 480 03/24/2021    FREET4 1 89 (H) 03/24/2021       Future Appointments   Date Time Provider Laura Montoya   4/11/2022  2:20 PM Celia Tong MD [facility]

## 2021-04-07 NOTE — PATIENT INSTRUCTIONS
The thyroid blood work is good  Continue the same levothyroxine 137 mcg daily  Follow up in 1 year with blood work

## 2021-11-04 ENCOUNTER — OFFICE VISIT (OUTPATIENT)
Dept: INTERNAL MEDICINE CLINIC | Facility: CLINIC | Age: 81
End: 2021-11-04
Payer: COMMERCIAL

## 2021-11-04 VITALS
SYSTOLIC BLOOD PRESSURE: 126 MMHG | HEIGHT: 66 IN | HEART RATE: 70 BPM | DIASTOLIC BLOOD PRESSURE: 70 MMHG | BODY MASS INDEX: 47.09 KG/M2 | TEMPERATURE: 97 F | OXYGEN SATURATION: 94 % | WEIGHT: 293 LBS

## 2021-11-04 DIAGNOSIS — J45.40 MODERATE PERSISTENT ASTHMA WITHOUT COMPLICATION: ICD-10-CM

## 2021-11-04 DIAGNOSIS — J45.30 MILD PERSISTENT ASTHMA WITHOUT COMPLICATION: Primary | ICD-10-CM

## 2021-11-04 DIAGNOSIS — I35.0 AORTIC VALVE STENOSIS, ETIOLOGY OF CARDIAC VALVE DISEASE UNSPECIFIED: ICD-10-CM

## 2021-11-04 DIAGNOSIS — M54.50 CHRONIC BILATERAL LOW BACK PAIN WITHOUT SCIATICA: ICD-10-CM

## 2021-11-04 DIAGNOSIS — M62.830 LUMBAR PARASPINAL MUSCLE SPASM: ICD-10-CM

## 2021-11-04 DIAGNOSIS — G89.29 CHRONIC BILATERAL LOW BACK PAIN WITHOUT SCIATICA: ICD-10-CM

## 2021-11-04 DIAGNOSIS — E66.01 OBESITY, MORBID (HCC): ICD-10-CM

## 2021-11-04 DIAGNOSIS — E66.2 PICKWICKIAN SYNDROME (HCC): ICD-10-CM

## 2021-11-04 PROCEDURE — 1036F TOBACCO NON-USER: CPT | Performed by: INTERNAL MEDICINE

## 2021-11-04 PROCEDURE — 3725F SCREEN DEPRESSION PERFORMED: CPT | Performed by: INTERNAL MEDICINE

## 2021-11-04 PROCEDURE — 1160F RVW MEDS BY RX/DR IN RCRD: CPT | Performed by: INTERNAL MEDICINE

## 2021-11-04 PROCEDURE — 99214 OFFICE O/P EST MOD 30 MIN: CPT | Performed by: INTERNAL MEDICINE

## 2021-11-04 RX ORDER — TRAMADOL HYDROCHLORIDE 50 MG/1
50 TABLET ORAL 2 TIMES DAILY PRN
Qty: 60 TABLET | Refills: 2 | Status: SHIPPED | OUTPATIENT
Start: 2021-11-04 | End: 2021-12-04

## 2021-11-04 RX ORDER — MONTELUKAST SODIUM 10 MG/1
10 TABLET ORAL DAILY
Qty: 90 TABLET | Refills: 3 | Status: SHIPPED | OUTPATIENT
Start: 2021-11-04 | End: 2022-03-07 | Stop reason: SDUPTHER

## 2021-12-07 DIAGNOSIS — K21.9 GASTROESOPHAGEAL REFLUX DISEASE WITHOUT ESOPHAGITIS: Primary | ICD-10-CM

## 2021-12-07 RX ORDER — OMEPRAZOLE 20 MG/1
CAPSULE, DELAYED RELEASE ORAL
Qty: 90 CAPSULE | Refills: 3 | Status: SHIPPED | OUTPATIENT
Start: 2021-12-07 | End: 2022-06-16 | Stop reason: ALTCHOICE

## 2022-01-23 DIAGNOSIS — E89.0 POSTOPERATIVE HYPOTHYROIDISM: ICD-10-CM

## 2022-01-23 DIAGNOSIS — Z86.39 HISTORY OF THYROID NODULE: ICD-10-CM

## 2022-01-24 RX ORDER — LEVOTHYROXINE SODIUM 137 UG/1
TABLET ORAL
Qty: 90 TABLET | Refills: 3 | Status: SHIPPED | OUTPATIENT
Start: 2022-01-24

## 2022-03-07 DIAGNOSIS — J45.40 MODERATE PERSISTENT ASTHMA WITHOUT COMPLICATION: ICD-10-CM

## 2022-03-07 RX ORDER — MONTELUKAST SODIUM 10 MG/1
10 TABLET ORAL DAILY
Qty: 90 TABLET | Refills: 3 | Status: SHIPPED | OUTPATIENT
Start: 2022-03-07 | End: 2022-03-08 | Stop reason: SDUPTHER

## 2022-03-08 ENCOUNTER — TELEMEDICINE (OUTPATIENT)
Dept: INTERNAL MEDICINE CLINIC | Facility: CLINIC | Age: 82
End: 2022-03-08
Payer: COMMERCIAL

## 2022-03-08 DIAGNOSIS — Z29.8 SBE (SUBACUTE BACTERIAL ENDOCARDITIS) PROPHYLAXIS CANDIDATE: ICD-10-CM

## 2022-03-08 DIAGNOSIS — J45.40 MODERATE PERSISTENT ASTHMA WITHOUT COMPLICATION: ICD-10-CM

## 2022-03-08 DIAGNOSIS — F41.9 ANXIETY: ICD-10-CM

## 2022-03-08 DIAGNOSIS — J01.01 ACUTE RECURRENT MAXILLARY SINUSITIS: Primary | ICD-10-CM

## 2022-03-08 PROCEDURE — 99441 PR PHYS/QHP TELEPHONE EVALUATION 5-10 MIN: CPT | Performed by: INTERNAL MEDICINE

## 2022-03-08 RX ORDER — LORAZEPAM 1 MG/1
1 TABLET ORAL AS NEEDED
Qty: 1 TABLET | Refills: 1 | Status: SHIPPED | OUTPATIENT
Start: 2022-03-08 | End: 2022-06-16

## 2022-03-08 RX ORDER — AMOXICILLIN 500 MG/1
2000 CAPSULE ORAL AS NEEDED
Qty: 16 CAPSULE | Refills: 0 | Status: SHIPPED | OUTPATIENT
Start: 2022-03-08 | End: 2022-03-12

## 2022-03-08 RX ORDER — MONTELUKAST SODIUM 10 MG/1
10 TABLET ORAL DAILY
Qty: 90 TABLET | Refills: 3 | Status: SHIPPED | OUTPATIENT
Start: 2022-03-08 | End: 2022-06-16

## 2022-03-08 NOTE — PROGRESS NOTES
Virtual Brief Visit    Patient is located in the following state in which I hold an active license [place]      Assessment/Plan:    Problem List Items Addressed This Visit        Respiratory    Acute recurrent maxillary sinusitis - Primary       Other    Anxiety          Recent Visits  Date Type Provider Dept   03/07/22 Telemedicine Meme Cordero, [address] [facility]   Showing recent visits within past 7 days and meeting all other requirements  Future Appointments  No visits were found meeting these conditions  Showing future appointments within next 150 days and meeting all other requirements         I spent 10 minutes directly with the patient during this visit      Assessment/Plan:    No problem-specific Assessment & Plan notes found for this encounter  Diagnoses and all orders for this visit:    Acute recurrent maxillary sinusitis    Anxiety          Subjective:      Patient ID: Donny Preciado is a [de-identified] y o  female  Anxiety over ct  abx pre dental per heart surgeon  Rhinitis clear--stop mucinex try zyrtec      The following portions of the patient's history were reviewed and updated as appropriate: allergies, current medications, past family history, past medical history, past social history, past surgical history, and problem list     Review of Systems   Constitutional: Negative for activity change, fatigue and fever  HENT: Positive for congestion, sinus pressure and sinus pain  Negative for ear discharge, ear pain, rhinorrhea and sore throat  Eyes: Negative for pain and visual disturbance  Respiratory: Negative for cough and shortness of breath  Cardiovascular: Negative for chest pain and leg swelling  Gastrointestinal: Negative for abdominal pain, constipation and diarrhea  Endocrine: Negative for cold intolerance and polyuria  Genitourinary: Negative for flank pain and hematuria  Musculoskeletal: Negative for back pain and joint swelling  Skin: Negative for pallor and wound  Neurological: Negative for dizziness, seizures and speech difficulty  Psychiatric/Behavioral: Negative for confusion and hallucinations  Objective: There were no vitals taken for this visit           Physical Exam

## 2022-03-18 DIAGNOSIS — N28.89 RENAL MASS, RIGHT: Primary | ICD-10-CM

## 2022-04-11 ENCOUNTER — OFFICE VISIT (OUTPATIENT)
Dept: ENDOCRINOLOGY | Facility: HOSPITAL | Age: 82
End: 2022-04-11
Payer: COMMERCIAL

## 2022-04-11 VITALS
DIASTOLIC BLOOD PRESSURE: 80 MMHG | HEART RATE: 66 BPM | BODY MASS INDEX: 47.09 KG/M2 | WEIGHT: 293 LBS | SYSTOLIC BLOOD PRESSURE: 154 MMHG | HEIGHT: 66 IN

## 2022-04-11 DIAGNOSIS — E89.0 POSTOPERATIVE HYPOTHYROIDISM: Primary | ICD-10-CM

## 2022-04-11 PROCEDURE — 1160F RVW MEDS BY RX/DR IN RCRD: CPT | Performed by: INTERNAL MEDICINE

## 2022-04-11 PROCEDURE — 1036F TOBACCO NON-USER: CPT | Performed by: INTERNAL MEDICINE

## 2022-04-11 PROCEDURE — 99213 OFFICE O/P EST LOW 20 MIN: CPT | Performed by: INTERNAL MEDICINE

## 2022-04-11 RX ORDER — MELATONIN
2000 DAILY
COMMUNITY

## 2022-04-11 RX ORDER — AMOXICILLIN 500 MG
CAPSULE ORAL
COMMUNITY

## 2022-04-11 NOTE — PROGRESS NOTES
4/11/2022    Assessment/Plan      Diagnoses and all orders for this visit:    Postoperative hypothyroidism  -     TSH, 3rd generation Lab Collect; Future  -     T4, free Lab Collect; Future    Other orders  -     cholecalciferol (VITAMIN D3) 1,000 units tablet; Take 2,000 Units by mouth daily  -     Bioflavonoid Products (C COMPLEX PO); Take 500 mg by mouth daily  -     Omega-3 Fatty Acids (Fish Oil) 1200 MG CAPS; Take by mouth Res q 1250 mg 2 twice a day  Assessment/Plan:  1  Hypothyroidism post thyroidectomy  Most recent thyroid function tests are still pending from last week  She is clinically euthyroid  I will have her called when her blood work returns but for now she will continue the same levothyroxine 137 mcg  Thyroid hormone will be adjusted as needed based on the blood work results  I have asked her to follow up in 1 year with preceding TSH and free T4       CC:  Hypothyroid follow-up    History of Present Illness     HPI: Luis Delgado is a [de-identified]y o  year old female with history of hypothyroidism post subtotal thyroidectomy here for follow-up visit  She was found to have a very large goiter and thyroid nodules in 2008  Since she was having a choking sensation in her throat when she bent her neck fall word, surgery was performed  Previous thyroid nodule biopsies have been benign  Subtotal thyroidectomy was performed in April 2008 due to the continued growth of the nodules and compressive thyroid symptoms  Pathology was benign  She is on thyroid hormone for replacement purposes  She is currently taking levothyroxine 137 mcg daily  She is always somewhat cold  She denies heat intolerance  She is fatigued  Weight is 4 lb more than last year  She has rare palpitations  She has some tremors  She has variable sleeping and is a bit anxious at times  She denies depression or diarrhea  She has constipation and dry skin along with brittle nails but no hair loss    She denies diplopia  She denies compressive thyroid symptoms or difficulties with swallowing  For AVR on Thursday via TAVR  Review of Systems   Constitutional: Positive for fatigue  Negative for unexpected weight change  Weight 4 lb more than last year  HENT: Negative for trouble swallowing  Eyes: Negative for visual disturbance  No diplopia  Respiratory: Positive for shortness of breath  Negative for chest tightness  SOB a lot  Worsens daily  Cardiovascular: Positive for palpitations  Negative for chest pain  Rare palpitations  Gastrointestinal: Positive for constipation  Negative for abdominal pain, diarrhea and nausea  Endocrine: Positive for cold intolerance  Negative for heat intolerance  Skin: Negative for rash  Has dry skin  Has brittle nails  No hair loss  Neurological: Positive for tremors and headaches  Negative for dizziness and light-headedness  Occasional headaches  Psychiatric/Behavioral: Positive for sleep disturbance  Negative for dysphoric mood  The patient is nervous/anxious  Variable sleeping  A bit anxious at times          Historical Information   Past Medical History:   Diagnosis Date    Aortic stenosis     Asthma     GERD (gastroesophageal reflux disease)     Heart murmur     Hyperlipidemia     Hypertension     Osteoarthritis     Spinal stenosis      Past Surgical History:   Procedure Laterality Date    LAPAROSCOPIC CHOLECYSTECTOMY      REDUCTION MAMMAPLASTY      REPLACEMENT TOTAL KNEE BILATERAL Bilateral     THYROIDECTOMY  2008    TOTAL ABDOMINAL HYSTERECTOMY W/ BILATERAL SALPINGOOPHORECTOMY      with appendectomy    TOTAL HIP ARTHROPLASTY Bilateral     TUBAL LIGATION Bilateral      Social History   Social History     Substance and Sexual Activity   Alcohol Use Yes    Comment: once a year     Social History     Substance and Sexual Activity   Drug Use Never    Comment: CBD oil and hemp PRn      Social History     Tobacco Use   Smoking Status Never Smoker   Smokeless Tobacco Never Used     Family History:   Family History   Problem Relation Age of Onset    Heart disease Mother     Diabetes type II Mother     Hypotension Father     Lung disease Father         from chemicals    Diabetes type II Brother     Kidney failure Brother     Lung cancer Daughter     Brain cancer Daughter     Kidney cancer Daughter     No Known Problems Son     Neurological problems Son     Anxiety disorder Son     Thyroid disease unspecified Daughter        Meds/Allergies   Current Outpatient Medications   Medication Sig Dispense Refill    amLODIPine (NORVASC) 2 5 mg tablet 2 (two) times a day   3    aspirin 81 MG tablet Take 81 mg by mouth 2 (two) times a day        atorvastatin (LIPITOR) 20 mg tablet Take 20 mg by mouth daily at bedtime   3    Bioflavonoid Products (C COMPLEX PO) Take 500 mg by mouth daily      cholecalciferol (VITAMIN D3) 1,000 units tablet Take 2,000 Units by mouth daily      furosemide (LASIX) 20 mg tablet Take 20 mg by mouth daily       levothyroxine 137 mcg tablet TAKE 1 TABLET BY MOUTH EVERY DAY 90 tablet 3    lisinopril (ZESTRIL) 20 mg tablet Take 20 mg by mouth 2 (two) times a day       metoprolol succinate (TOPROL-XL) 25 mg 24 hr tablet Take 25 mg by mouth daily      montelukast (SINGULAIR) 10 mg tablet Take 1 tablet (10 mg total) by mouth daily 90 tablet 3    Omega-3 Fatty Acids (Fish Oil) 1200 MG CAPS Take by mouth Res q 1250 mg 2 twice a day   omeprazole (PriLOSEC) 20 mg delayed release capsule TAKE 1 CAPSULE BY MOUTH EVERY DAY 90 capsule 3    traMADol (ULTRAM) 50 mg tablet       LORazepam (ATIVAN) 1 mg tablet Take 1 tablet (1 mg total) by mouth if needed for anxiety (1 hr pre procedure-no driving) (Patient not taking: Reported on 4/11/2022 ) 1 tablet 1     No current facility-administered medications for this visit       No Known Allergies    Objective   Vitals: Blood pressure 154/80, pulse 66, height 5' 6" (1 676 m), weight (!) 145 kg (320 lb 6 4 oz)  Invasive Devices  Report    None                 Physical Exam  Vitals reviewed  Constitutional:       Appearance: Normal appearance  She is well-developed  She is obese  HENT:      Head: Normocephalic and atraumatic  Eyes:      Extraocular Movements: Extraocular movements intact  Conjunctiva/sclera: Conjunctivae normal       Comments: No lid lag, stare, proptosis, or periorbital edema   Neck:      Thyroid: No thyromegaly  Vascular: No carotid bruit  Comments: Healed anterior neck scar  No palpable thyroid tissue  Cardiovascular:      Rate and Rhythm: Normal rate and regular rhythm  Heart sounds: Murmur heard  Comments: 2/6 Blowing holosystolic murmur  Pulmonary:      Effort: Pulmonary effort is normal       Breath sounds: Normal breath sounds  No wheezing  Abdominal:      Palpations: Abdomen is soft  Musculoskeletal:         General: No deformity  Normal range of motion  Cervical back: Normal range of motion and neck supple  Right lower leg: Edema present  Left lower leg: Edema present  Comments: 1-2+ bilateral lower extremity edema  Has a cyst on left dorsum of the foot around 2 cm  No tremor of the outstretched hands  Lymphadenopathy:      Cervical: No cervical adenopathy  Skin:     General: Skin is warm and dry  Findings: No rash  Neurological:      Mental Status: She is alert and oriented to person, place, and time  Deep Tendon Reflexes: Reflexes are normal and symmetric  Comments: Deep tendon reflexes normal          The history was obtained from the review of the chart and from the patient      Lab Results:      Most recent thyroid function tests which were done on 04/07/2022 at [facility] are still pending    Lab Results   Component Value Date    TSH 1 480 03/24/2021    FREET4 1 89 (H) 03/24/2021             Future Appointments   Date Time Provider Laura Montoya   4/12/2023  1:20 PM Arnoldo Saini MD [facility]

## 2022-04-11 NOTE — PATIENT INSTRUCTIONS
The thyroid blood work is still pending from last week  we will call you with results  for now, continue the same levothyroxine 137 mcg daily  follow up in 1 year with blood work

## 2022-04-16 LAB
T4 FREE SERPL DIALY-MCNC: 2.2 NG/DL
TSH SERPL-ACNC: 1.7 UU/ML

## 2022-04-21 ENCOUNTER — TELEPHONE (OUTPATIENT)
Dept: INTERNAL MEDICINE CLINIC | Facility: CLINIC | Age: 82
End: 2022-04-21

## 2022-04-21 NOTE — TELEPHONE ENCOUNTER
Lucia Zapata ct surgery home nurse from [facility] called, [phone], patient in hospital 4/14/ to 4/18, dc summary on your shelf, she had a left transfemoral TAVR done,     Saw patient today, when patient up and moves around she wheezes, sitting in chair can not hear wheeze, has post nasal drip and feels like she is getting a cold, room air sitting in chair 90-92 %, when she does spirometry she gets 1250 and o2 goes to 96%, lungs sounds decreased on upper right, temp 97 9, hr 55 reg, weight 306, lower ext edema +2 mostly on upper feet, cardiologist dr Leslie Chaudhary has patient on 40 mg q d,   bp meds changed from dc on 4/20 by heart doc to metroprol 50mg bid, norvasc 5 mg bid, bp today rt 136/58, left 140/57    Heart doc says wheezing not from heart, to call pcp, patient has allergies, takes zytec, and robitussin,     Can not get to office, not allowed to drive, family over 1 hour a way, can not do virtual visit , doesn't have computer,     Can you call her [phone]

## 2022-04-21 NOTE — TELEPHONE ENCOUNTER
S/w patient, informed her to go to er, she is going to call her son to take her, also left  for nurse durand regarding dr Anna Jewell comments

## 2022-05-20 ENCOUNTER — CLINICAL SUPPORT (OUTPATIENT)
Dept: CARDIAC REHAB | Facility: HOSPITAL | Age: 82
End: 2022-05-20
Payer: COMMERCIAL

## 2022-05-20 DIAGNOSIS — Z95.2 S/P TAVR (TRANSCATHETER AORTIC VALVE REPLACEMENT): Primary | ICD-10-CM

## 2022-05-20 PROCEDURE — 93797 PHYS/QHP OP CAR RHAB WO ECG: CPT

## 2022-05-20 NOTE — PROGRESS NOTES
CARDIAC REHAB ASSESSMENT    Today's date: May 20, 2022  Patient name: Grace Aguilera     : 1940       MRN: [id number]  PCP: Kamran Molina DO  Referring Physician: Prakash Hernandez MD  Cardiologist: Dr Brandy Díaz [facility])  Surgeon: n/a  Dx:   Encounter Diagnosis   Name Primary?  S/P TAVR (transcatheter aortic valve replacement) Yes       Date of onset: 2022  Cultural needs: n/a    Weight  316 lb      Height:   Ht Readings from Last 1 Encounters:   22 5' 6" (1 676 m)     Medical History:   Past Medical History:   Diagnosis Date    Aortic stenosis     Asthma     GERD (gastroesophageal reflux disease)     Heart murmur     Hyperlipidemia     Hypertension     Osteoarthritis     Spinal stenosis          Physical Limitations: back pain-spinal stenosis  Fall Risk: Moderate   Comments: Patient uses walking assist device (walker/cane/rollator)    Anginal Equivalent: None/denies angina   NTG use: No prescription    Risk Factors   Cholesterol: Yes  Smoking: Never used  HTN: Yes  DM: No  Obesity: Yes   Inactivity: Yes  Stress:  perceived  stress: 2/10   Stressors:reports minimal stress   Goals for Stress Management:Practice Relaxation Techniques, Exercise, Keep a positive mindset and Enjoy a hobby    Family History:  Family History   Problem Relation Age of Onset    Heart disease Mother     Diabetes type II Mother     Hypotension Father     Lung disease Father         from chemicals    Diabetes type II Brother     Kidney failure Brother     Lung cancer Daughter     Brain cancer Daughter     Kidney cancer Daughter     No Known Problems Son     Neurological problems Son    Reyes Anxiety disorder Son     Thyroid disease unspecified Daughter        Allergies: Patient has no known allergies    ETOH:   Social History     Substance and Sexual Activity   Alcohol Use Yes    Comment: once a year         Current Medications:   Current Outpatient Medications   Medication Sig Dispense Refill  amLODIPine (NORVASC) 2 5 mg tablet 2 (two) times a day   3    aspirin 81 MG tablet Take 81 mg by mouth 2 (two) times a day        atorvastatin (LIPITOR) 20 mg tablet Take 20 mg by mouth daily at bedtime   3    Bioflavonoid Products (C COMPLEX PO) Take 500 mg by mouth daily      cholecalciferol (VITAMIN D3) 1,000 units tablet Take 2,000 Units by mouth daily      furosemide (LASIX) 20 mg tablet Take 20 mg by mouth daily       levothyroxine 137 mcg tablet TAKE 1 TABLET BY MOUTH EVERY DAY 90 tablet 3    lisinopril (ZESTRIL) 20 mg tablet Take 20 mg by mouth 2 (two) times a day       LORazepam (ATIVAN) 1 mg tablet Take 1 tablet (1 mg total) by mouth if needed for anxiety (1 hr pre procedure-no driving) (Patient not taking: Reported on 4/11/2022 ) 1 tablet 1    metoprolol succinate (TOPROL-XL) 25 mg 24 hr tablet Take 25 mg by mouth daily      montelukast (SINGULAIR) 10 mg tablet Take 1 tablet (10 mg total) by mouth daily 90 tablet 3    Omega-3 Fatty Acids (Fish Oil) 1200 MG CAPS Take by mouth Res q 1250 mg 2 twice a day   omeprazole (PriLOSEC) 20 mg delayed release capsule TAKE 1 CAPSULE BY MOUTH EVERY DAY 90 capsule 3    traMADol (ULTRAM) 50 mg tablet        No current facility-administered medications for this visit  Functional Status Prior to Diagnosis for Treatment   Occupation: retired  Recreation: meeting with friends to socialize  ADLs: No limitations  [place]ert: No limitations  Exercise: no regular aerobic exercise  Other: n/a    Current Functional Status  Occupation: retired  Recreation: meeting with friends to socialize  ADLs: No limitations  [place]: No limitations  Exercise: no regular aerobic exercise  Other: n/a    Patient Specific Goals:   Increase strength and endurance with exercise to be able to do chores around the house, be able to move around with less pain    Short Term Program Goals: dietary modifications increased strength improved energy/stamina with ADLs exercise 120-150 mins/wk wt loss 1-2 ppw    Long Term Goals: increased maximal walking duration  increased intial training workload  Improved [facility] Activity Status score  Improved functional capacity  Improved Quality of Life - [facility] score reduced  Improved lipid profile  Reduced body fat%  improved Rate Your Plate Score    Ability to reach goals/rehabilitation potential:  Good    Projected return to function: 12 weeks  Objective tests: sub-max NuStep ETT      Nutritional   Reviewed details of Rate your Plate  Discussed key elements of heart healthy eating  Reviewed patient goals for dietary modifications and their clinical implications  Reviewed most recent lipid profile       Goals for dietary modification: choose lean cuts of meat  poultry without the skin  low fat ground meat and poultry  eliminate processed meats  reduce portions of meat to 3 oz  increase fish intake  more meatless meals  low fat dairy   reduced fat cheese  increase whole grains  increase fruits and vegetables  eliminate butter  low sodium  improved snack choices  more nuts/seeds  reduce sweets/frozen desserts      Emotional/Social  Patient reports he/she is coping well with good social support and denies depression or anxiety  Reports sufficient emotional support    Marital status:     Domestic Violence Screening: No    Comments: n/a

## 2022-05-20 NOTE — PROGRESS NOTES
Cardiac Rehabilitation Plan of Care   Initial Care Plan          Today's date: 2022   # of Exercise Sessions Completed: 1-evaluation  Patient name: Shayy Silva      : 1940  Age: [de-identified] y o  MRN: [id number]  Referring Physician: Mary Aguilera MD  Cardiologist: Dr Carla Porras [facility])  Provider: Jerry  Clinician: Rashel Pérez MS, CEP      Dx:   Encounter Diagnosis   Name Primary?  S/P TAVR (transcatheter aortic valve replacement) Yes     Date of onset: 2022      SUMMARY OF PROGRESS:  Mrs Roman Calles is here today for her cardiac rehabilitation after recent TAVR  She reports she is doing well overall, but does have trouble sleeping at times which makes her more fatigued during the day  She reports she has been retaining fluid in her lower legs  She is monitoring weight and knows to let cardiology know if she gains more than 5 lbs in a day  So far, she reports she has not gained any more than 5 lbs in a day  She is trying to decrease sodium intake to also help to decrease retention  Her main goals for her rehab program are to increase strength and endurance with exercise to be able to do chores around the house, and be able to move around with less pain  She reports she is most limited with activities due to back pain  She denies depression (PHQ-9=6), and anxiety (LUIS-7=1)  She reports tiredness due to not sleeping well, but no feelings of depression or being down  She has good support from her friends who she meets up with for breakfast each week  She completed NS ETT today reaching 9 min at 2 1 METs with stable hemodynamic response to exercise  She rated exercise as easy to moderate  Telemetry showed NSR  Will plan to start exercise at 2 0-2 5 METs and increase as tolerated by patient over first 30 days of rehab  She is in agreement to attend cardiac rehab sessions 3x/week for 12 weeks, or 36 sessions  She will start her sessions 2022         Medication compliance: Yes   Comments: Pt reports to be compliant with medications  Fall Risk: Moderate   Comments: Patient uses walking assist device (walker/cane/rollator)    EKG Interpretation: NSR      EXERCISE ASSESSMENT and PLAN    Current Exercise Program in Rehab:       Frequency: 3 days/week Supplement with home exercise 2+ days/wk as tolerated       Minutes: 30-40         METS: 2 0-2 5            HR: 30 beats above resting   RPE: 4-6         Modalities: UBE, NuStep and Room walking      Exercise Progression 30 Day Goals :    Frequency: 3 days/week of cardiac rehab     Supplement with home exercise 2+ days/wk as tolerated    Minutes: 3                            >150 mins/wk of moderate intensity exercise   METS: 40   HR: 30 beats above resting    RPE: 4-6   Modalities: UBE, NuStep and Room walking    Strength trainin-3 days / week  12-15 repetitions  1-2 sets per modality   Will be added following 2-3 weeks of monitored exercise sessions   Modalities: Pull Downs, Lateral Raise, Arm Extension, Arm Curl, Sit to [employer] and Shoulder Shrugs    Home Exercise: none    Goals: 10% improvement in functional capacity - based on max METs achieved in fitness assessment, improved DASI score by 10%, Increase in exercise capacity by 40% - based on peak METs tolerated in cardiac rehab exercise session, Exercise 5 days/wk, >150mins/wk of moderate intensity exercise, Resume ADLs with increased strength, Attend Rehab regularly, Decrease sitting time and Start a walking program    Progression Toward Goals:  Reviewed Pt goals and determined plan of care    Education: benefit of exercise for CAD risk factors, home exercise guidelines, AHA guidelines to achieve >150 mins/wk of moderate exercise and RPE scale   Plan:education on home exercise guidelines, home exercise 30+ mins 2 days opposite CR and Education class: Risk Factors for Heart Disease  Readiness to change: Preparation:  (Getting ready to change)       NUTRITION ASSESSMENT AND PLAN    Weight control:    Starting weight: 316 lb   Current weight:       Diabetes: N/A  A1c: n/a    last measured: n/a    Lipid management: Discussed diet and lipid management and no recent lipids on profile    Goals:LDL <100, HDL >40, TRG <150 and CHOL <200    Progression Toward Goals: Reviewed Pt goals and determined plan of care    Education: heart healthy eating  low sodium diet  hydration  nutrition for  lipid management  Plan: Education class: Reading Food Labels and Education Class: Heart Healthy Eating  Readiness to change: Preparation:  (Getting ready to change)       PSYCHOSOCIAL ASSESSMENT AND PLAN    Emotional:  Depression assessment:  PHQ-9 = 1-4 = Minimal Depression            Anxiety measure:  LUIS-7 = 0-4  = Not anxious  Self-reported stress level:  2  Social support: Very Good    Goals:  Physical Fitness in [facility] Score < 3, Daily Activity in [facility] Score < 3, Pain in [facility] Score < 3, Overall Health in [facility] Score < 3, Quality of Life in [facility] Score < 3 , Change in Health in [place] Score < 3 , Increased interest in doing things and increased energy    Progression Toward Goals: Reviewed Pt goals and determined plan of care    Education: signs/sxs of depression and benefits of a positive support system  Plan: Class: Stress and Your Health and Class: Relaxation  Readiness to change: Action:  (Changing behavior)      OTHER CORE COMPONENTS     Tobacco:   Social History     Tobacco Use   Smoking Status Never Smoker   Smokeless Tobacco Never Used       Tobacco Use Intervention:   N/A:  Patient is a non-smoker     Anginal Symptoms:  None   NTG use: No prescription    Blood pressure:    Restin/72   Exercise: 168/72    Goals: consistent BP < 130/80, reduced dietary sodium <2300mg, moderate intensity exercise >150 mins/wk, medication compliance and reduce number of medications  needed for BP control    Progression Toward Goals: Reviewed Pt goals and determined plan of care-reports elevated BP    Education:  understanding high blood pressure and it's relationship to CAD and low sodium diet and HTN  Plan: Class: Understanding Heart Disease and Class: Common Heart Medications  Readiness to change: Preparation:  (Getting ready to change)  and Action:  (Changing behavior)

## 2022-05-26 ENCOUNTER — CLINICAL SUPPORT (OUTPATIENT)
Dept: CARDIAC REHAB | Facility: HOSPITAL | Age: 82
End: 2022-05-26
Payer: COMMERCIAL

## 2022-05-26 DIAGNOSIS — Z95.2 S/P TAVR (TRANSCATHETER AORTIC VALVE REPLACEMENT): Primary | ICD-10-CM

## 2022-05-26 PROCEDURE — 93798 PHYS/QHP OP CAR RHAB W/ECG: CPT

## 2022-05-27 ENCOUNTER — CLINICAL SUPPORT (OUTPATIENT)
Dept: CARDIAC REHAB | Facility: HOSPITAL | Age: 82
End: 2022-05-27
Payer: COMMERCIAL

## 2022-05-27 DIAGNOSIS — Z95.2 S/P TAVR (TRANSCATHETER AORTIC VALVE REPLACEMENT): Primary | ICD-10-CM

## 2022-05-27 PROCEDURE — 93798 PHYS/QHP OP CAR RHAB W/ECG: CPT

## 2022-05-31 ENCOUNTER — CLINICAL SUPPORT (OUTPATIENT)
Dept: CARDIAC REHAB | Facility: HOSPITAL | Age: 82
End: 2022-05-31
Payer: COMMERCIAL

## 2022-05-31 DIAGNOSIS — Z95.2 S/P TAVR (TRANSCATHETER AORTIC VALVE REPLACEMENT): Primary | ICD-10-CM

## 2022-05-31 PROCEDURE — 93798 PHYS/QHP OP CAR RHAB W/ECG: CPT

## 2022-06-02 ENCOUNTER — APPOINTMENT (OUTPATIENT)
Dept: CARDIAC REHAB | Facility: HOSPITAL | Age: 82
End: 2022-06-02
Payer: COMMERCIAL

## 2022-06-03 ENCOUNTER — CLINICAL SUPPORT (OUTPATIENT)
Dept: CARDIAC REHAB | Facility: HOSPITAL | Age: 82
End: 2022-06-03
Payer: COMMERCIAL

## 2022-06-03 DIAGNOSIS — Z95.2 S/P TAVR (TRANSCATHETER AORTIC VALVE REPLACEMENT): Primary | ICD-10-CM

## 2022-06-03 PROCEDURE — 93798 PHYS/QHP OP CAR RHAB W/ECG: CPT

## 2022-06-07 ENCOUNTER — CLINICAL SUPPORT (OUTPATIENT)
Dept: CARDIAC REHAB | Facility: HOSPITAL | Age: 82
End: 2022-06-07
Payer: COMMERCIAL

## 2022-06-07 DIAGNOSIS — Z95.2 S/P TAVR (TRANSCATHETER AORTIC VALVE REPLACEMENT): Primary | ICD-10-CM

## 2022-06-07 PROCEDURE — 93798 PHYS/QHP OP CAR RHAB W/ECG: CPT

## 2022-06-09 ENCOUNTER — CLINICAL SUPPORT (OUTPATIENT)
Dept: CARDIAC REHAB | Facility: HOSPITAL | Age: 82
End: 2022-06-09
Payer: COMMERCIAL

## 2022-06-09 DIAGNOSIS — Z95.2 S/P TAVR (TRANSCATHETER AORTIC VALVE REPLACEMENT): Primary | ICD-10-CM

## 2022-06-09 PROCEDURE — 93798 PHYS/QHP OP CAR RHAB W/ECG: CPT

## 2022-06-10 ENCOUNTER — CLINICAL SUPPORT (OUTPATIENT)
Dept: CARDIAC REHAB | Facility: HOSPITAL | Age: 82
End: 2022-06-10
Payer: COMMERCIAL

## 2022-06-10 DIAGNOSIS — Z95.2 S/P TAVR (TRANSCATHETER AORTIC VALVE REPLACEMENT): Primary | ICD-10-CM

## 2022-06-10 PROCEDURE — 93798 PHYS/QHP OP CAR RHAB W/ECG: CPT

## 2022-06-14 ENCOUNTER — APPOINTMENT (OUTPATIENT)
Dept: CARDIAC REHAB | Facility: HOSPITAL | Age: 82
End: 2022-06-14
Payer: COMMERCIAL

## 2022-06-16 ENCOUNTER — OFFICE VISIT (OUTPATIENT)
Dept: INTERNAL MEDICINE CLINIC | Facility: CLINIC | Age: 82
End: 2022-06-16
Payer: COMMERCIAL

## 2022-06-16 VITALS
HEART RATE: 70 BPM | DIASTOLIC BLOOD PRESSURE: 70 MMHG | TEMPERATURE: 97.8 F | RESPIRATION RATE: 14 BRPM | SYSTOLIC BLOOD PRESSURE: 140 MMHG | HEIGHT: 66 IN | BODY MASS INDEX: 47.09 KG/M2 | WEIGHT: 293 LBS | OXYGEN SATURATION: 95 %

## 2022-06-16 DIAGNOSIS — M17.0 PRIMARY OSTEOARTHRITIS OF BOTH KNEES: ICD-10-CM

## 2022-06-16 DIAGNOSIS — M15.9 PRIMARY OSTEOARTHRITIS INVOLVING MULTIPLE JOINTS: ICD-10-CM

## 2022-06-16 DIAGNOSIS — K42.9 UMBILICAL HERNIA WITHOUT OBSTRUCTION AND WITHOUT GANGRENE: ICD-10-CM

## 2022-06-16 DIAGNOSIS — D64.9 ANEMIA, UNSPECIFIED TYPE: ICD-10-CM

## 2022-06-16 DIAGNOSIS — Z95.2 S/P TAVR (TRANSCATHETER AORTIC VALVE REPLACEMENT): ICD-10-CM

## 2022-06-16 DIAGNOSIS — I10 HYPERTENSION, UNSPECIFIED TYPE: ICD-10-CM

## 2022-06-16 DIAGNOSIS — Z12.31 ENCOUNTER FOR SCREENING MAMMOGRAM FOR BREAST CANCER: ICD-10-CM

## 2022-06-16 DIAGNOSIS — E66.2 PICKWICKIAN SYNDROME (HCC): ICD-10-CM

## 2022-06-16 DIAGNOSIS — J45.30 MILD PERSISTENT ASTHMA WITHOUT COMPLICATION: Primary | ICD-10-CM

## 2022-06-16 DIAGNOSIS — Z78.0 ASYMPTOMATIC POSTMENOPAUSAL STATE: ICD-10-CM

## 2022-06-16 DIAGNOSIS — Z13.6 SCREENING FOR CARDIOVASCULAR CONDITION: ICD-10-CM

## 2022-06-16 DIAGNOSIS — Z00.00 MEDICARE ANNUAL WELLNESS VISIT, SUBSEQUENT: ICD-10-CM

## 2022-06-16 DIAGNOSIS — E89.0 POSTOPERATIVE HYPOTHYROIDISM: ICD-10-CM

## 2022-06-16 PROBLEM — U07.1 COVID-19: Status: ACTIVE | Noted: 2022-06-16

## 2022-06-16 PROBLEM — E87.1 HYPONATREMIA: Status: ACTIVE | Noted: 2022-06-16

## 2022-06-16 PROCEDURE — G0439 PPPS, SUBSEQ VISIT: HCPCS | Performed by: INTERNAL MEDICINE

## 2022-06-16 PROCEDURE — 1036F TOBACCO NON-USER: CPT | Performed by: INTERNAL MEDICINE

## 2022-06-16 RX ORDER — DEXAMETHASONE 2 MG/1
TABLET ORAL
COMMUNITY
Start: 2022-04-25 | End: 2022-06-16

## 2022-06-16 RX ORDER — AMLODIPINE BESYLATE 5 MG/1
5 TABLET ORAL
COMMUNITY

## 2022-06-16 RX ORDER — FUROSEMIDE 40 MG/1
40 TABLET ORAL DAILY
COMMUNITY

## 2022-06-16 RX ORDER — POLYETHYLENE GLYCOL 3350 17 G/17G
POWDER, FOR SOLUTION ORAL
COMMUNITY
Start: 2022-04-21

## 2022-06-16 RX ORDER — LISINOPRIL 40 MG/1
40 TABLET ORAL DAILY
Qty: 30 TABLET | Refills: 3 | Status: SHIPPED | OUTPATIENT
Start: 2022-06-16 | End: 2022-07-08

## 2022-06-16 RX ORDER — TRAMADOL HYDROCHLORIDE 50 MG/1
50 TABLET ORAL EVERY 8 HOURS PRN
Qty: 90 TABLET | Refills: 0 | Status: SHIPPED | OUTPATIENT
Start: 2022-06-16 | End: 2022-07-16

## 2022-06-16 RX ORDER — FERROUS SULFATE 325(65) MG
1 TABLET ORAL DAILY
COMMUNITY
Start: 2022-05-14 | End: 2022-06-16

## 2022-06-16 NOTE — PATIENT INSTRUCTIONS
Medicare Preventive Visit Patient Instructions  Thank you for completing your Welcome to Medicare Visit or Medicare Annual Wellness Visit today  Your next wellness visit will be due in one year (6/17/2023)  The screening/preventive services that you may require over the next 5-10 years are detailed below  Some tests may not apply to you based off risk factors and/or age  Screening tests ordered at today's visit but not completed yet may show as past due  Also, please note that scanned in results may not display below  Preventive Screenings:  Service Recommendations Previous Testing/Comments   Colorectal Cancer Screening  * Colonoscopy    * Fecal Occult Blood Test (FOBT)/Fecal Immunochemical Test (FIT)  * Fecal DNA/Cologuard Test  * Flexible Sigmoidoscopy Age: 54-65 years old   Colonoscopy: every 10 years (may be performed more frequently if at higher risk)  OR  FOBT/FIT: every 1 year  OR  Cologuard: every 3 years  OR  Sigmoidoscopy: every 5 years  Screening may be recommended earlier than age 48 if at higher risk for colorectal cancer  Also, an individualized decision between you and your healthcare provider will decide whether screening between the ages of 74-80 would be appropriate  Colonoscopy: Not on file  FOBT/FIT: Not on file  Cologuard: Not on file  Sigmoidoscopy: Not on file          Breast Cancer Screening Age: 36 years old  Frequency: every 1-2 years  Not required if history of left and right mastectomy Mammogram: 07/20/2015        Cervical Cancer Screening Between the ages of 21-29, pap smear recommended once every 3 years  Between the ages of 33-67, can perform pap smear with HPV co-testing every 5 years     Recommendations may differ for women with a history of total hysterectomy, cervical cancer, or abnormal pap smears in past  Pap Smear: Not on file        Hepatitis C Screening Once for adults born between [facility]  More frequently in patients at high risk for Hepatitis C Hep C Antibody: Not on file        Diabetes Screening 1-2 times per year if you're at risk for diabetes or have pre-diabetes Fasting glucose: No results in last 5 years   A1C: 6 4        Cholesterol Screening Once every 5 years if you don't have a lipid disorder  May order more often based on risk factors  Lipid panel: Not on file          Other Preventive Screenings Covered by Medicare:  1  Abdominal Aortic Aneurysm (AAA) Screening: covered once if your at risk  You're considered to be at risk if you have a family history of AAA  2  Lung Cancer Screening: covers low dose CT scan once per year if you meet all of the following conditions: (1) Age 50-69; (2) No signs or symptoms of lung cancer; (3) Current smoker or have quit smoking within the last 15 years; (4) You have a tobacco smoking history of at least 30 pack years (packs per day multiplied by number of years you smoked); (5) You get a written order from a healthcare provider  3  Glaucoma Screening: covered annually if you're considered high risk: (1) You have diabetes OR (2) Family history of glaucoma OR (3)  aged 48 and older OR (3)  American aged 72 and older  3  Osteoporosis Screening: covered every 2 years if you meet one of the following conditions: (1) You're estrogen deficient and at risk for osteoporosis based off medical history and other findings; (2) Have a vertebral abnormality; (3) On glucocorticoid therapy for more than 3 months; (4) Have primary hyperparathyroidism; (5) On osteoporosis medications and need to assess response to drug therapy  · Last bone density test (DXA Scan): Not on file  5  HIV Screening: covered annually if you're between the age of 12-76  Also covered annually if you are younger than 13 and older than 72 with risk factors for HIV infection  For pregnant patients, it is covered up to 3 times per pregnancy      Immunizations:  Immunization Recommendations   Influenza Vaccine Annual influenza vaccination during flu season is recommended for all persons aged >= 6 months who do not have contraindications   Pneumococcal Vaccine (Prevnar and Pneumovax)  * Prevnar = PCV13  * Pneumovax = PPSV23   Adults 25-60 years old: 1-3 doses may be recommended based on certain risk factors  Adults 72 years old: Prevnar (PCV13) vaccine recommended followed by Pneumovax (PPSV23) vaccine  If already received PPSV23 since turning 65, then PCV13 recommended at least one year after PPSV23 dose  Hepatitis B Vaccine 3 dose series if at intermediate or high risk (ex: diabetes, end stage renal disease, liver disease)   Tetanus (Td) Vaccine - COST NOT COVERED BY MEDICARE PART B Following completion of primary series, a booster dose should be given every 10 years to maintain immunity against tetanus  Td may also be given as tetanus wound prophylaxis  Tdap Vaccine - COST NOT COVERED BY MEDICARE PART B Recommended at least once for all adults  For pregnant patients, recommended with each pregnancy  Shingles Vaccine (Shingrix) - COST NOT COVERED BY MEDICARE PART B  2 shot series recommended in those aged 48 and above     Health Maintenance Due:  There are no preventive care reminders to display for this patient  Immunizations Due:      Topic Date Due    COVID-19 Vaccine (1) Never done    DTaP,Tdap,and Td Vaccines (1 - Tdap) Never done    Influenza Vaccine (Season Ended) 09/01/2022     Advance Directives   What are advance directives? Advance directives are legal documents that state your wishes and plans for medical care  These plans are made ahead of time in case you lose your ability to make decisions for yourself  Advance directives can apply to any medical decision, such as the treatments you want, and if you want to donate organs  What are the types of advance directives? There are many types of advance directives, and each state has rules about how to use them   You may choose a combination of any of the following:  · Living will: This is a written record of the treatment you want  You can also choose which treatments you do not want, which to limit, and which to stop at a certain time  This includes surgery, medicine, IV fluid, and tube feedings  · Durable power of  for healthcare [facility]): This is a written record that states who you want to make healthcare choices for you when you are unable to make them for yourself  This person, called a proxy, is usually a family member or a friend  You may choose more than 1 proxy  · Do not resuscitate (DNR) order:  A DNR order is used in case your heart stops beating or you stop breathing  It is a request not to have certain forms of treatment, such as CPR  A DNR order may be included in other types of advance directives  · Medical directive: This covers the care that you want if you are in a coma, near death, or unable to make decisions for yourself  You can list the treatments you want for each condition  Treatment may include pain medicine, surgery, blood transfusions, dialysis, IV or tube feedings, and a ventilator (breathing machine)  · Values history: This document has questions about your views, beliefs, and how you feel and think about life  This information can help others choose the care that you would choose  Why are advance directives important? An advance directive helps you control your care  Although spoken wishes may be used, it is better to have your wishes written down  Spoken wishes can be misunderstood, or not followed  Treatments may be given even if you do not want them  An advance directive may make it easier for your family to make difficult choices about your care  Weight Management   Why it is important to manage your weight:  Being overweight increases your risk of health conditions such as heart disease, high blood pressure, type 2 diabetes, and certain types of cancer   It can also increase your risk for osteoarthritis, sleep apnea, and other respiratory problems  Aim for a slow, steady weight loss  Even a small amount of weight loss can lower your risk of health problems  How to lose weight safely:  A safe and healthy way to lose weight is to eat fewer calories and get regular exercise  You can lose up about 1 pound a week by decreasing the number of calories you eat by 500 calories each day  Healthy meal plan for weight management:  A healthy meal plan includes a variety of foods, contains fewer calories, and helps you stay healthy  A healthy meal plan includes the following:  · Eat whole-grain foods more often  A healthy meal plan should contain fiber  Fiber is the part of grains, fruits, and vegetables that is not broken down by your body  Whole-grain foods are healthy and provide extra fiber in your diet  Some examples of whole-grain foods are whole-wheat breads and pastas, oatmeal, brown rice, and bulgur  · Eat a variety of vegetables every day  Include dark, leafy greens such as spinach, kale, hans greens, and mustard greens  Eat yellow and orange vegetables such as carrots, sweet potatoes, and winter squash  · Eat a variety of fruits every day  Choose fresh or canned fruit (canned in its own juice or light syrup) instead of juice  Fruit juice has very little or no fiber  · Eat low-fat dairy foods  Drink fat-free (skim) milk or 1% milk  Eat fat-free yogurt and low-fat cottage cheese  Try low-fat cheeses such as mozzarella and other reduced-fat cheeses  · Choose meat and other protein foods that are low in fat  Choose beans or other legumes such as split peas or lentils  Choose fish, skinless poultry (chicken or turkey), or lean cuts of red meat (beef or pork)  Before you cook meat or poultry, cut off any visible fat  · Use less fat and oil  Try baking foods instead of frying them  Add less fat, such as margarine, sour cream, regular salad dressing and mayonnaise to foods  Eat fewer high-fat foods   Some examples of high-fat foods include french fries, doughnuts, ice cream, and cakes  · Eat fewer sweets  Limit foods and drinks that are high in sugar  This includes candy, cookies, regular soda, and sweetened drinks  Exercise:  Exercise at least 30 minutes per day on most days of the week  Some examples of exercise include walking, biking, dancing, and swimming  You can also fit in more physical activity by taking the stairs instead of the elevator or parking farther away from stores  Ask your healthcare provider about the best exercise plan for you  © Copyright [employer] 2018 Information is for End User's use only and may not be sold, redistributed or otherwise used for commercial purposes   All illustrations and images included in CareNotes® are the copyrighted property of A D A M , Inc  or [address] [place] [address]

## 2022-06-16 NOTE — PROGRESS NOTES
Assessment and Plan:     Problem List Items Addressed This Visit        Endocrine    Postoperative hypothyroidism       Respiratory    Asthma - Primary       Musculoskeletal and Integument    Primary osteoarthritis of both knees       Other    Pickwickian syndrome (HCC)    S/P TAVR (transcatheter aortic valve replacement)    Umbilical hernia without obstruction and without gangrene    Relevant Orders    Ambulatory Referral to General Surgery    Asymptomatic postmenopausal state    Relevant Orders    DXA bone density spine hip and pelvis    Medicare annual wellness visit, subsequent    Anemia    Relevant Orders    CBC and differential    Iron    TIBC    Ferritin      Other Visit Diagnoses     Hypertension, unspecified type        Relevant Medications    amLODIPine (NORVASC) 5 mg tablet    furosemide (LASIX) 40 mg tablet    lisinopril (ZESTRIL) 40 mg tablet        BMI Counseling: There is no height or weight on file to calculate BMI  The BMI is above normal  Nutrition recommendations include decreasing portion sizes  Exercise recommendations include exercising 3-5 times per week  Patient referred to PCP  Rationale for BMI follow-up plan is due to patient being overweight or obese  Falls Plan of Care: balance, strength, and gait training instructions were provided  Home safety education provided  Preventive health issues were discussed with patient, and age appropriate screening tests were ordered as noted in patient's After Visit Summary  Personalized health advice and appropriate referrals for health education or preventive services given if needed, as noted in patient's After Visit Summary       History of Present Illness:     Patient presents for a Medicare Wellness Visit    3 weeks rehab post 4/2022 tavr  Getting uprite walker and bent over too much  Declines mammogram and dexa    Told hernia in hospital 4/2022  Not noticed    B/l tkr b/l thr  Wt  Pain > 10 years     Patient Care Team:  Duong Lujan DO [place] as PCP - General (Internal Medicine)  Anjali Morton MD as PCP - Endocrinology (Endocrinology)     Review of Systems:     Review of Systems   Constitutional: Negative for activity change, appetite change, chills, diaphoresis, fatigue and fever  HENT: Negative for congestion, facial swelling, hearing loss, mouth sores, rhinorrhea, sore throat, trouble swallowing and voice change  Eyes: Negative for photophobia and pain  Respiratory: Negative for apnea, cough, chest tightness, shortness of breath and stridor  Cardiovascular: Negative for chest pain, palpitations and leg swelling  Gastrointestinal: Negative for abdominal distention, abdominal pain, blood in stool and constipation  Endocrine: Negative for cold intolerance and heat intolerance  Genitourinary: Negative for difficulty urinating, dysuria, flank pain, genital sores, hematuria and urgency  Musculoskeletal: Negative for arthralgias, back pain, gait problem, joint swelling and myalgias  Skin: Negative for rash and wound  Allergic/Immunologic: Negative for environmental allergies, food allergies and immunocompromised state  Neurological: Negative for dizziness, tremors, seizures, syncope, facial asymmetry, speech difficulty, weakness, light-headedness, numbness and headaches  Hematological: Negative for adenopathy  Does not bruise/bleed easily  Psychiatric/Behavioral: Negative for agitation, behavioral problems, hallucinations, self-injury, sleep disturbance and suicidal ideas          Problem List:     Patient Active Problem List   Diagnosis    Postoperative hypothyroidism    History of thyroid nodule    Asthma    Aortic stenosis    Moderate persistent asthma without complication    Obesity, morbid (HCC)    Lumbar paraspinal muscle spasm    Chronic bilateral low back pain without sciatica    Pickwickian syndrome (HCC)    Acute recurrent maxillary sinusitis    Anxiety    COVID-19    Hyponatremia    S/P TAVR (transcatheter aortic valve replacement)    Umbilical hernia without obstruction and without gangrene    Asymptomatic postmenopausal state    Medicare annual wellness visit, subsequent    Anemia    Primary osteoarthritis of both knees      Past Medical and Surgical History:     Past Medical History:   Diagnosis Date    Aortic stenosis     Asthma     GERD (gastroesophageal reflux disease)     Heart murmur     Hyperlipidemia     Hypertension     Osteoarthritis     Spinal stenosis      Past Surgical History:   Procedure Laterality Date    LAPAROSCOPIC CHOLECYSTECTOMY      REDUCTION MAMMAPLASTY      REPLACEMENT TOTAL KNEE BILATERAL Bilateral     THYROIDECTOMY  2008    TOTAL ABDOMINAL HYSTERECTOMY W/ BILATERAL SALPINGOOPHORECTOMY      with appendectomy    TOTAL HIP ARTHROPLASTY Bilateral     TUBAL LIGATION Bilateral       Family History:     Family History   Problem Relation Age of Onset    Heart disease Mother     Diabetes type II Mother     Hypotension Father     Lung disease Father         from chemicals    Diabetes type II Brother     Kidney failure Brother     Lung cancer Daughter     Brain cancer Daughter     Kidney cancer Daughter     No Known Problems Son     Neurological problems Son    Willena Rand Anxiety disorder Son     Thyroid disease unspecified Daughter       Social History:     Social History     Socioeconomic History    Marital status:       Spouse name: Not on file    Number of children: Not on file    Years of education: Not on file    Highest education level: Not on file   Occupational History    Occupation:  at [address]: retired   Tobacco Use    Smoking status: Never Smoker    Smokeless tobacco: Never Used   Vaping Use    Vaping Use: Never used   Substance and Sexual Activity    Alcohol use: Yes     Comment: once a year    Drug use: Never     Comment: CBD oil and hemp PRn     Sexual activity: Not on file   Other Topics Concern    Not on file Social History Narrative    Not on file     Social Determinants of Health     Financial Resource Strain: Not on file   Food Insecurity: Not on file   Transportation Needs: Not on file   Physical Activity: Not on file   Stress: Not on file   Social Connections: Not on file   Intimate Partner Violence: Not on file   Housing Stability: Not on file      Medications and Allergies:     Current Outpatient Medications   Medication Sig Dispense Refill    amLODIPine (NORVASC) 5 mg tablet Take 5 mg by mouth 2 (two) times a day      aspirin 81 MG tablet Take 81 mg by mouth 2 (two) times a day        atorvastatin (LIPITOR) 20 mg tablet Take 20 mg by mouth daily at bedtime   3    Bioflavonoid Products (C COMPLEX PO) Take 500 mg by mouth daily      cholecalciferol (VITAMIN D3) 1,000 units tablet Take 2,000 Units by mouth daily      furosemide (LASIX) 40 mg tablet Take 40 mg by mouth in the morning      levothyroxine 137 mcg tablet TAKE 1 TABLET BY MOUTH EVERY DAY 90 tablet 3    lisinopril (ZESTRIL) 40 mg tablet Take 1 tablet (40 mg total) by mouth daily 30 tablet 3    metoprolol succinate (TOPROL-XL) 25 mg 24 hr tablet Take 25 mg by mouth daily      montelukast (SINGULAIR) 10 mg tablet Take 1 tablet (10 mg total) by mouth daily 90 tablet 3    Omega-3 Fatty Acids (Fish Oil) 1200 MG CAPS Take by mouth Res q 1250 mg 2 twice a day   polyethylene glycol (GLYCOLAX) 17 GM/SCOOP powder Take by mouth       No current facility-administered medications for this visit  Allergies   Allergen Reactions    Pollen Extract Nasal Congestion      Immunizations:     Immunization History   Administered Date(s) Administered    INFLUENZA 11/24/2014, 10/08/2015    Pneumococcal Polysaccharide PPV23 11/24/2014    Zoster 10/08/2015      Health Maintenance: There are no preventive care reminders to display for this patient        Topic Date Due    COVID-19 Vaccine (1) Never done    DTaP,Tdap,and Td Vaccines (1 - Tdap) Never done    Influenza Vaccine (Season Ended) 09/01/2022      Medicare Screening Tests and Risk Assessments:     Annual Wellness Visit  No exam data present     Physical Exam:     /70   Pulse 70   Temp 97 8 °F (36 6 °C)   Resp 14   Ht 5' 6" (1 676 m)   Wt (!) 142 kg (314 lb)   SpO2 95%   BMI 50 68 kg/m²     Physical Exam  Constitutional:       General: She is not in acute distress  Appearance: Normal appearance  She is not ill-appearing or toxic-appearing  HENT:      Head: Normocephalic and atraumatic  Right Ear: Tympanic membrane and external ear normal       Left Ear: Tympanic membrane and external ear normal       Nose: Nose normal       Mouth/Throat:      Mouth: Mucous membranes are moist       Pharynx: Oropharynx is clear  Eyes:      General: No scleral icterus  Right eye: No discharge  Left eye: No discharge  Extraocular Movements: Extraocular movements intact  Conjunctiva/sclera: Conjunctivae normal       Pupils: Pupils are equal, round, and reactive to light  Neck:      Vascular: No carotid bruit  Cardiovascular:      Rate and Rhythm: Normal rate and regular rhythm  Pulses: Normal pulses  Heart sounds: No murmur heard  No friction rub  No gallop  Pulmonary:      Effort: Pulmonary effort is normal       Breath sounds: Normal breath sounds  No wheezing, rhonchi or rales  Abdominal:      General: Bowel sounds are normal  There is no distension  Palpations: Abdomen is soft  There is no mass  Tenderness: There is no guarding or rebound  Musculoskeletal:         General: No swelling  Cervical back: Normal range of motion and neck supple  No rigidity  Right lower leg: No edema  Left lower leg: No edema  Lymphadenopathy:      Cervical: No cervical adenopathy  Skin:     General: Skin is warm  Capillary Refill: Capillary refill takes less than 2 seconds  Coloration: Skin is not jaundiced        Findings: No rash    Neurological:      General: No focal deficit present  Mental Status: She is alert and oriented to person, place, and time  Cranial Nerves: No cranial nerve deficit  Sensory: No sensory deficit  Motor: No weakness        Gait: Gait normal       Deep Tendon Reflexes: Reflexes normal    Psychiatric:         Mood and Affect: Mood normal          Behavior: Behavior normal          Judgment: Judgment normal           Alee Malagon DO

## 2022-06-16 NOTE — PROGRESS NOTES
Cardiac Rehabilitation Plan of Care   30 Day Reassessment          Today's date: 2022   # of Exercise Sessions Completed: 8  Patient name: Ressie Krabbe      : 1940  Age: 81 y o  MRN: [id number]  Referring Physician: Clover Duarte MD  Cardiologist: Dr Rumalda Goldmann [facility])  Provider: Jerry  Clinician: Karen Nur MS, CEP      Dx:   Encounter Diagnosis   Name Primary?  S/P TAVR (transcatheter aortic valve replacement) Yes     Date of onset: 2022      SUMMARY OF PROGRESS:  Mrs Alejandra De Santiago has started her cardiac rehabilitation after recent TAVR  She reports she is doing well overall, but does have trouble sleeping at times which makes her more fatigued during the day  She reports she has been retaining fluid in her lower legs  She is monitoring weight and knows to let cardiology know if she gains more than 5 lbs in a day  So far, she reports she has not gained any more than 5 lbs in a day  She is trying to decrease sodium intake to also help to decrease retention  She has completed  8 sessions of her cardiac rehab over the first 30 days  She is currently completing 3-35 min of aerobic exercise during her cardiac rehab sessions with stable hemodynamic response to exercise  Telemetry shows NSR  She does struggle with back, knee, and shoulder pain so exercise is hard for her  She completes amount of exercise she feels she can tolerate, but she has missed several sessions due to pain  Her main goals for her rehab program are to increase strength and endurance with exercise to be able to do chores around the house, and be able to move around with less pain  She reports she is most limited with activities due to back pain  She is progressing slowly towards her goals at 30 days due to pain as limiting factor  Will plan to continue increasing exercise time at low workloads as she is able to tolerate   She is participating in weekly education classes on cardiac risk factor management  She denies depression (PHQ-9=6), and anxiety (LUIS-7=1)  She reports tiredness due to not sleeping well, but no feelings of depression or being down  She has good support from her friends who she meets up with for breakfast each week        Medication compliance: Yes   Comments: Pt reports to be compliant with medications  Fall Risk: Moderate   Comments: Patient uses walking assist device (walker/cane/rollator)    EKG Interpretation: NSR      EXERCISE ASSESSMENT and PLAN    Current Exercise Program in Rehab:       Frequency: 3 days/week Supplement with home exercise 2+ days/wk as tolerated       Minutes: 30-35         METS: 2 0            HR: 30 beats above resting   RPE: 4-6         Modalities: UBE, NuStep and Room walking      Exercise Progression 30 Day Goals :    Frequency: 3 days/week of cardiac rehab     Supplement with home exercise 2+ days/wk as tolerated    Minutes: 35-40                            >150 mins/wk of moderate intensity exercise   METS: 2 0-2 5   HR: 30 beats above resting    RPE: 4-6   Modalities: UBE, NuStep and Room walking    Strength trainin-3 days / week  12-15 repetitions  1-2 sets per modality   Will be added following 2-3 weeks of monitored exercise sessions   Modalities: Pull Downs, Lateral Raise, Arm Extension, Arm Curl, Sit to [employer] and Shoulder Shrugs    Home Exercise: none    Goals: 10% improvement in functional capacity - based on max METs achieved in fitness assessment, improved DASI score by 10%, Increase in exercise capacity by 40% - based on peak METs tolerated in cardiac rehab exercise session, Exercise 5 days/wk, >150mins/wk of moderate intensity exercise, Resume ADLs with increased strength, Attend Rehab regularly, Decrease sitting time and Start a walking program    Progression Toward Goals:  Pt is progressing and showing improvement  toward the following goals:  increasing exercise times and intensities as toelrated, attending rehab sessions at least 2x/week, improving overall strength in legs-limited by back, shoulder, and knee pain  Education: benefit of exercise for CAD risk factors, home exercise guidelines, AHA guidelines to achieve >150 mins/wk of moderate exercise and RPE scale   Plan:education on home exercise guidelines, home exercise 30+ mins 2 days opposite CR and Education class: Risk Factors for Heart Disease  Readiness to change: Preparation:  (Getting ready to change)       NUTRITION ASSESSMENT AND PLAN    Weight control:    Starting weight: 316 lb   Current weight: 318 lb     Diabetes: N/A  A1c: n/a    last measured: n/a    Lipid management: Discussed diet and lipid management and no recent lipids on profile    Goals:LDL <100, HDL >40, TRG <150 and CHOL <200    Progression Toward Goals: Pt has not made progress toward the following goals: no changes in diet, eating our for breakfast, weight is up 2 lbs over past 30 days  Education: heart healthy eating  low sodium diet  hydration  nutrition for  lipid management  Plan: Education class: Reading Food Labels and Education Class: Heart Healthy Eating  Readiness to change: Preparation:  (Getting ready to change)       PSYCHOSOCIAL ASSESSMENT AND PLAN    Emotional:  Depression assessment:  PHQ-9 = 1-4 = Minimal Depression            Anxiety measure:  LUIS-7 = 0-4  = Not anxious  Self-reported stress level:  2  Social support: Very Good    Goals:  Physical Fitness in [facility] Score < 3, Daily Activity in [facility] Score < 3, Pain in [facility] Score < 3, Overall Health in [facility] Score < 3, Quality of Life in [facility] Score < 3 , Change in Health in [place] Score < 3 , Increased interest in doing things and increased energy    Progression Toward Goals: Pt is progressing and showing improvement  toward the following goals:  no concerns with depression or anxiety currently          Education: signs/sxs of depression and benefits of a positive support system  Plan: Class: Stress and Your Health and Class: Relaxation  Readiness to change: Action:  (Changing behavior)      OTHER CORE COMPONENTS     Tobacco:   Social History     Tobacco Use   Smoking Status Never Smoker   Smokeless Tobacco Never Used       Tobacco Use Intervention:   N/A:  Patient is a non-smoker     Anginal Symptoms:  None   NTG use: No prescription    Blood pressure:    Restin//72   Exercise: 126//72    Goals: consistent BP < 130/80, reduced dietary sodium <2300mg, moderate intensity exercise >150 mins/wk, medication compliance and reduce number of medications  needed for BP control    Progression Toward Goals: Pt is progressing and showing improvement  toward the following goals:  BP elevated at times at rest         Education:  understanding high blood pressure and it's relationship to CAD and low sodium diet and HTN  Plan: Class: Understanding Heart Disease and Class: Common Heart Medications  Readiness to change: Preparation:  (Getting ready to change)  and Action:  (Changing behavior)

## 2022-06-17 ENCOUNTER — APPOINTMENT (OUTPATIENT)
Dept: CARDIAC REHAB | Facility: HOSPITAL | Age: 82
End: 2022-06-17
Payer: COMMERCIAL

## 2022-06-17 NOTE — PROGRESS NOTES
Marco Antonio [place]      Dear Dr Ta Shows,     Thank you for referring your patient to our cardiac  rehabilitation program  The patient has completed 8  visits  However, rehab is being discontinued at this time due to:    Voluntary Dropout:  The patient has chosen to quit due to : Patient getting hernia surgery  Will let Dr Tristan Crum [facility]) know of discharge  Please contact us at [phone] if you have any questions about this patents case  Thank you for your continued support of cardiac rehabilitation         Sincerely,      Nesha Hale MS, CEP

## 2022-06-21 ENCOUNTER — APPOINTMENT (OUTPATIENT)
Dept: CARDIAC REHAB | Facility: HOSPITAL | Age: 82
End: 2022-06-21
Payer: COMMERCIAL

## 2022-06-23 ENCOUNTER — APPOINTMENT (OUTPATIENT)
Dept: CARDIAC REHAB | Facility: HOSPITAL | Age: 82
End: 2022-06-23
Payer: COMMERCIAL

## 2022-06-24 ENCOUNTER — APPOINTMENT (OUTPATIENT)
Dept: CARDIAC REHAB | Facility: HOSPITAL | Age: 82
End: 2022-06-24
Payer: COMMERCIAL

## 2022-06-27 ENCOUNTER — CONSULT (OUTPATIENT)
Dept: SURGERY | Facility: HOSPITAL | Age: 82
End: 2022-06-27
Payer: COMMERCIAL

## 2022-06-27 VITALS
DIASTOLIC BLOOD PRESSURE: 68 MMHG | BODY MASS INDEX: 47.09 KG/M2 | TEMPERATURE: 97.9 F | WEIGHT: 293 LBS | RESPIRATION RATE: 22 BRPM | HEART RATE: 69 BPM | SYSTOLIC BLOOD PRESSURE: 185 MMHG | HEIGHT: 66 IN

## 2022-06-27 DIAGNOSIS — E66.01 OBESITIES, MORBID (HCC): Primary | ICD-10-CM

## 2022-06-27 DIAGNOSIS — K42.9 UMBILICAL HERNIA WITHOUT OBSTRUCTION AND WITHOUT GANGRENE: ICD-10-CM

## 2022-06-27 PROCEDURE — 99203 OFFICE O/P NEW LOW 30 MIN: CPT | Performed by: SURGERY

## 2022-06-28 ENCOUNTER — APPOINTMENT (OUTPATIENT)
Dept: CARDIAC REHAB | Facility: HOSPITAL | Age: 82
End: 2022-06-28
Payer: COMMERCIAL

## 2022-06-30 ENCOUNTER — APPOINTMENT (OUTPATIENT)
Dept: CARDIAC REHAB | Facility: HOSPITAL | Age: 82
End: 2022-06-30
Payer: COMMERCIAL

## 2022-07-01 NOTE — PROGRESS NOTES
Assessment/Plan:   Rj Rivera is a 81 y  o female who is here for Hernia (New patient referred by her PCP for umbilical bulge/hernia  Noted by staff while patient was in [facility]  Had TAVR done 4/14/22  Currently in cardiac rehab  Some shortness of breath noted with exertion today  Denies any pain at site  Weight today was 320lbs with BMI of 51 71 )    Plan: Umbilical hernia/Morbid obesity -discussed operative vs conservative mgt, surgical approaches, risks and benefits and patient is not a surgical candidate given her multiple co morbidities and morbid obesity  Would only consider elective surgical repair if patient lost significant weight        HPI:  Rj Rivera is a 81 y  o female who was referred for evaluation of Hernia (New patient referred by her PCP for umbilical bulge/hernia  Noted by staff while patient was in [facility]  Had TAVR done 4/14/22  Currently in cardiac rehab  Some shortness of breath noted with exertion today  Denies any pain at site  Weight today was 320lbs with BMI of 51 71 )            ROS:  General ROS: negative  negative for - chills, fatigue, fever or night sweats, weight loss  Respiratory ROS: no cough, shortness of breath, or wheezing  Cardiovascular ROS: no chest pain or dyspnea on exertion  Genito-Urinary ROS: no dysuria, trouble voiding, or hematuria  Musculoskeletal ROS: negative for - gait disturbance, joint pain or muscle pain  Neurological ROS: no TIA or stroke symptoms  umbilial lump with no pain    [unfilled]  Pollen extract    Current Outpatient Medications:     amLODIPine (NORVASC) 5 mg tablet, Take 5 mg by mouth 2 (two) times a day, Disp: , Rfl:     aspirin 81 MG tablet, Take 81 mg by mouth 2 (two) times a day  , Disp: , Rfl:     atorvastatin (LIPITOR) 20 mg tablet, Take 20 mg by mouth daily at bedtime , Disp: , Rfl: 3    Bioflavonoid Products (C COMPLEX PO), Take 500 mg by mouth daily, Disp: , Rfl:     cholecalciferol (VITAMIN D3) 1,000 units tablet, Take 2,000 Units by mouth daily, Disp: , Rfl:     furosemide (LASIX) 40 mg tablet, Take 40 mg by mouth in the morning, Disp: , Rfl:     levothyroxine 137 mcg tablet, TAKE 1 TABLET BY MOUTH EVERY DAY, Disp: 90 tablet, Rfl: 3    lisinopril (ZESTRIL) 40 mg tablet, Take 1 tablet (40 mg total) by mouth daily, Disp: 30 tablet, Rfl: 3    metoprolol succinate (TOPROL-XL) 25 mg 24 hr tablet, Take 25 mg by mouth daily, Disp: , Rfl:     Omega-3 Fatty Acids (Fish Oil) 1200 MG CAPS, Take by mouth Res q 1250 mg 2 twice a day , Disp: , Rfl:     polyethylene glycol (GLYCOLAX) 17 GM/SCOOP powder, Take by mouth, Disp: , Rfl:     montelukast (SINGULAIR) 10 mg tablet, Take 1 tablet (10 mg total) by mouth daily, Disp: 90 tablet, Rfl: 3    traMADol (ULTRAM) 50 mg tablet, Take 1 tablet (50 mg total) by mouth every 8 (eight) hours as needed for moderate pain, Disp: 90 tablet, Rfl: 0  Past Medical History:   Diagnosis Date    Aortic stenosis     Asthma     GERD (gastroesophageal reflux disease)     Heart murmur     Hyperlipidemia     Hypertension     Osteoarthritis     Spinal stenosis      Past Surgical History:   Procedure Laterality Date    LAPAROSCOPIC CHOLECYSTECTOMY      REDUCTION MAMMAPLASTY      REPLACEMENT TOTAL KNEE BILATERAL Bilateral     THYROIDECTOMY  2008    TOTAL ABDOMINAL HYSTERECTOMY W/ BILATERAL SALPINGOOPHORECTOMY      with appendectomy    TOTAL HIP ARTHROPLASTY Bilateral     TUBAL LIGATION Bilateral      Family History   Problem Relation Age of Onset    Heart disease Mother     Diabetes type II Mother     Hypotension Father     Lung disease Father         from chemicals    Diabetes type II Brother     Kidney failure Brother     Lung cancer Daughter     Brain cancer Daughter     Kidney cancer Daughter     No Known Problems Son     Neurological problems Son    Theresa Gabriel Anxiety disorder Son     Thyroid disease unspecified Daughter       reports that she has never smoked   She has never used smokeless tobacco  She reports current alcohol use  She reports that she does not use drugs  Labs:   No results found for: WBC, HGB, PLT  No results found for: ALT, AST  This SmartLink has not been configured with any valid records  PHYSICAL EXAM  General Appearance:    Alert, cooperative, no distress,    Head:    Normocephalic without obvious abnormality   Eyes:    PERRL, conjunctiva/corneas clear, EOM's intact        Neck:   Supple, no adenopathy, no JVD   Back:     Symmetric, no spinal or CVA tenderness   Lungs:     Clear to auscultation bilaterally, no wheezing or rhonchi   Heart:    Regular rate and rhythm, S1 and S2 normal, no murmur   Abdomen:     Soft +BS ND morbid obesity NT partially reducible umbilical hernia   Extremities:   Extremities normal  No clubbing, cyanosis or edema   Psych:   Normal Affect, AOx3  Neurologic:  Skin:   CNII-XII intact  Strength symmetric, speech intact    Warm, dry, intact, no visible rashes or lesions         Physical Exam              Some portions of this record may have been generated with voice recognition software  There may be translation, syntax,  or grammatical errors  Occasional wrong word or "sound-a-like" substitutions may have occurred due to the inherent limitations of the voice recognition software  Read the chart carefully and recognize, using context, where substitutions may have occurred  If you have any questions, please contact the dictating provider for clarification or correction, as needed  This encounter has been coded by a non-certified coder

## 2022-07-07 DIAGNOSIS — E87.1 HYPONATREMIA: Primary | ICD-10-CM

## 2022-07-07 LAB
ALBUMIN SERPL-MCNC: 4 G/DL (ref 3.6–4.6)
ALBUMIN/GLOB SERPL: 1.6 {RATIO} (ref 1.2–2.2)
ALP SERPL-CCNC: 76 IU/L (ref 44–121)
ALT SERPL-CCNC: 10 IU/L (ref 0–32)
AST SERPL-CCNC: 21 IU/L (ref 0–40)
BASOPHILS # BLD AUTO: 0 X10E3/UL (ref 0–0.2)
BASOPHILS NFR BLD AUTO: 1 %
BILIRUB SERPL-MCNC: 0.5 MG/DL (ref 0–1.2)
BUN SERPL-MCNC: 16 MG/DL (ref 8–27)
BUN/CREAT SERPL: 18 (ref 12–28)
CALCIUM SERPL-MCNC: 9.5 MG/DL (ref 8.7–10.3)
CHLORIDE SERPL-SCNC: 88 MMOL/L (ref 96–106)
CHOLEST SERPL-MCNC: 235 MG/DL (ref 100–199)
CO2 SERPL-SCNC: 25 MMOL/L (ref 20–29)
CREAT SERPL-MCNC: 0.88 MG/DL (ref 0.57–1)
EGFR: 66 ML/MIN/1.73
EOSINOPHIL # BLD AUTO: 0.2 X10E3/UL (ref 0–0.4)
EOSINOPHIL NFR BLD AUTO: 3 %
ERYTHROCYTE [DISTWIDTH] IN BLOOD BY AUTOMATED COUNT: 14.2 % (ref 11.7–15.4)
FERRITIN SERPL-MCNC: 128 NG/ML (ref 15–150)
GLOBULIN SER-MCNC: 2.5 G/DL (ref 1.5–4.5)
GLUCOSE SERPL-MCNC: 120 MG/DL (ref 65–99)
HCT VFR BLD AUTO: 36.3 % (ref 34–46.6)
HDLC SERPL-MCNC: 70 MG/DL
HGB BLD-MCNC: 12.1 G/DL (ref 11.1–15.9)
IMM GRANULOCYTES # BLD: 0 X10E3/UL (ref 0–0.1)
IMM GRANULOCYTES NFR BLD: 0 %
IRON SATN MFR SERPL: 15 % (ref 15–55)
IRON SERPL-MCNC: 54 UG/DL (ref 27–139)
LDLC SERPL CALC-MCNC: 149 MG/DL (ref 0–99)
LYMPHOCYTES # BLD AUTO: 1.8 X10E3/UL (ref 0.7–3.1)
LYMPHOCYTES NFR BLD AUTO: 36 %
MCH RBC QN AUTO: 29.4 PG (ref 26.6–33)
MCHC RBC AUTO-ENTMCNC: 33.3 G/DL (ref 31.5–35.7)
MCV RBC AUTO: 88 FL (ref 79–97)
MONOCYTES # BLD AUTO: 0.7 X10E3/UL (ref 0.1–0.9)
MONOCYTES NFR BLD AUTO: 14 %
NEUTROPHILS # BLD AUTO: 2.3 X10E3/UL (ref 1.4–7)
NEUTROPHILS NFR BLD AUTO: 46 %
PLATELET # BLD AUTO: 219 X10E3/UL (ref 150–450)
POTASSIUM SERPL-SCNC: 4.6 MMOL/L (ref 3.5–5.2)
PROT SERPL-MCNC: 6.5 G/DL (ref 6–8.5)
RBC # BLD AUTO: 4.11 X10E6/UL (ref 3.77–5.28)
SL AMB VLDL CHOLESTEROL CALC: 16 MG/DL (ref 5–40)
SODIUM SERPL-SCNC: 127 MMOL/L (ref 134–144)
TIBC SERPL-MCNC: 354 UG/DL (ref 250–450)
TRIGL SERPL-MCNC: 92 MG/DL (ref 0–149)
UIBC SERPL-MCNC: 300 UG/DL (ref 118–369)
WBC # BLD AUTO: 5 X10E3/UL (ref 3.4–10.8)

## 2022-07-23 DIAGNOSIS — I10 HYPERTENSION, UNSPECIFIED TYPE: ICD-10-CM

## 2022-07-23 RX ORDER — LISINOPRIL 40 MG/1
TABLET ORAL
Qty: 90 TABLET | Refills: 0 | Status: SHIPPED | OUTPATIENT
Start: 2022-07-23

## 2022-07-29 LAB
ALBUMIN SERPL-MCNC: 4.1 G/DL (ref 3.6–4.6)
BUN SERPL-MCNC: 19 MG/DL (ref 8–27)
BUN/CREAT SERPL: 21 (ref 12–28)
CALCIUM SERPL-MCNC: 9.4 MG/DL (ref 8.7–10.3)
CHLORIDE SERPL-SCNC: 95 MMOL/L (ref 96–106)
CO2 SERPL-SCNC: 27 MMOL/L (ref 20–29)
CREAT SERPL-MCNC: 0.92 MG/DL (ref 0.57–1)
EGFR: 63 ML/MIN/1.73
GLUCOSE SERPL-MCNC: 119 MG/DL (ref 65–99)
PHOSPHATE SERPL-MCNC: 4.1 MG/DL (ref 3–4.3)
POTASSIUM SERPL-SCNC: 4.8 MMOL/L (ref 3.5–5.2)
SODIUM SERPL-SCNC: 136 MMOL/L (ref 134–144)

## 2022-09-15 ENCOUNTER — TELEPHONE (OUTPATIENT)
Dept: INTERNAL MEDICINE CLINIC | Facility: CLINIC | Age: 82
End: 2022-09-15

## 2022-09-15 DIAGNOSIS — Z95.2 S/P TAVR (TRANSCATHETER AORTIC VALVE REPLACEMENT): Primary | ICD-10-CM

## 2022-09-15 RX ORDER — AMOXICILLIN 500 MG/1
2000 CAPSULE ORAL ONCE
Qty: 4 CAPSULE | Refills: 3 | Status: SHIPPED | OUTPATIENT
Start: 2022-09-15 | End: 2022-09-15

## 2022-09-15 RX ORDER — AMOXICILLIN 500 MG/1
CAPSULE ORAL
COMMUNITY
Start: 2022-07-23 | End: 2022-09-15 | Stop reason: SDUPTHER

## 2022-10-11 PROBLEM — Z00.00 MEDICARE ANNUAL WELLNESS VISIT, SUBSEQUENT: Status: RESOLVED | Noted: 2022-06-16 | Resolved: 2022-10-11

## 2022-10-11 PROBLEM — J01.01 ACUTE RECURRENT MAXILLARY SINUSITIS: Status: RESOLVED | Noted: 2022-03-08 | Resolved: 2022-10-11

## 2022-10-31 DIAGNOSIS — E89.0 POSTOPERATIVE HYPOTHYROIDISM: ICD-10-CM

## 2022-10-31 DIAGNOSIS — I10 HYPERTENSION, UNSPECIFIED TYPE: ICD-10-CM

## 2022-10-31 DIAGNOSIS — Z86.39 HISTORY OF THYROID NODULE: ICD-10-CM

## 2022-10-31 RX ORDER — LEVOTHYROXINE SODIUM 137 UG/1
TABLET ORAL
Qty: 90 TABLET | Refills: 3 | Status: SHIPPED | OUTPATIENT
Start: 2022-10-31

## 2022-10-31 RX ORDER — LISINOPRIL 40 MG/1
TABLET ORAL
Qty: 90 TABLET | Refills: 0 | Status: SHIPPED | OUTPATIENT
Start: 2022-10-31

## 2022-12-04 DIAGNOSIS — J45.40 MODERATE PERSISTENT ASTHMA WITHOUT COMPLICATION: ICD-10-CM

## 2022-12-04 DIAGNOSIS — I10 HYPERTENSION, UNSPECIFIED TYPE: ICD-10-CM

## 2022-12-04 RX ORDER — LISINOPRIL 40 MG/1
TABLET ORAL
Qty: 90 TABLET | Refills: 0 | Status: SHIPPED | OUTPATIENT
Start: 2022-12-04

## 2022-12-04 RX ORDER — MONTELUKAST SODIUM 10 MG/1
TABLET ORAL
Qty: 90 TABLET | Refills: 3 | Status: SHIPPED | OUTPATIENT
Start: 2022-12-04

## 2023-01-16 ENCOUNTER — OFFICE VISIT (OUTPATIENT)
Dept: INTERNAL MEDICINE CLINIC | Facility: CLINIC | Age: 83
End: 2023-01-16

## 2023-01-16 VITALS
RESPIRATION RATE: 12 BRPM | TEMPERATURE: 98 F | SYSTOLIC BLOOD PRESSURE: 140 MMHG | HEART RATE: 58 BPM | OXYGEN SATURATION: 96 % | HEIGHT: 66 IN | DIASTOLIC BLOOD PRESSURE: 80 MMHG | BODY MASS INDEX: 47.09 KG/M2 | WEIGHT: 293 LBS

## 2023-01-16 DIAGNOSIS — Z95.2 S/P TAVR (TRANSCATHETER AORTIC VALVE REPLACEMENT): ICD-10-CM

## 2023-01-16 DIAGNOSIS — I10 ESSENTIAL (PRIMARY) HYPERTENSION: ICD-10-CM

## 2023-01-16 DIAGNOSIS — D23.9 DERMATOFIBROMA: ICD-10-CM

## 2023-01-16 DIAGNOSIS — G47.33 OBSTRUCTIVE SLEEP APNEA: ICD-10-CM

## 2023-01-16 DIAGNOSIS — I35.0 AORTIC VALVE STENOSIS, ETIOLOGY OF CARDIAC VALVE DISEASE UNSPECIFIED: ICD-10-CM

## 2023-01-16 DIAGNOSIS — Z01.818 PRE-OP EXAM: Primary | ICD-10-CM

## 2023-01-16 DIAGNOSIS — E03.9 HYPOTHYROIDISM, UNSPECIFIED TYPE: ICD-10-CM

## 2023-01-16 RX ORDER — AMLODIPINE BESYLATE 2.5 MG/1
5 TABLET ORAL 2 TIMES DAILY
COMMUNITY
Start: 2022-12-02 | End: 2023-01-23 | Stop reason: SDUPTHER

## 2023-01-16 RX ORDER — AMOXICILLIN 500 MG/1
CAPSULE ORAL
COMMUNITY
Start: 2023-01-16

## 2023-01-16 NOTE — PROGRESS NOTES
BMI Counseling: There is no height or weight on file to calculate BMI  The BMI is above normal  Nutrition recommendations include decreasing portion sizes  Exercise recommendations include exercising 3-5 times per week  Patient referred to PCP  Rationale for BMI follow-up plan is due to patient being overweight or obese  Assessment/Plan:    No problem-specific Assessment & Plan notes found for this encounter  Diagnoses and all orders for this visit:    Pre-op exam    Essential (primary) hypertension    Hypothyroidism, unspecified type    Aortic valve stenosis, etiology of cardiac valve disease unspecified    Obstructive sleep apnea    S/P TAVR (transcatheter aortic valve replacement)  Comments:  4/2022    Other orders  -     amoxicillin (AMOXIL) 500 mg capsule  -     amLODIPine (NORVASC) 2 5 mg tablet; Take 5 mg by mouth 2 (two) times a day          Subjective:      Patient ID: Samuel Valadez is a 81 y o  female  dermofibroma L foot from odilon  Years ago  Dr Edis Silva   1/27/2023      The following portions of the patient's history were reviewed and updated as appropriate: allergies, current medications, past family history, past medical history, past social history, past surgical history, and problem list     Review of Systems   Constitutional: Negative for activity change and fatigue  HENT: Negative for ear discharge, ear pain, rhinorrhea and sore throat  Eyes: Negative for pain and visual disturbance  Respiratory: Negative for cough and shortness of breath  Cardiovascular: Negative for chest pain and leg swelling  Gastrointestinal: Negative for abdominal pain, constipation and diarrhea  Endocrine: Negative for cold intolerance and polyuria  Genitourinary: Negative for flank pain and hematuria  Musculoskeletal: Negative for back pain and joint swelling  Skin: Negative for pallor and wound  Neurological: Negative for dizziness, seizures and speech difficulty  Psychiatric/Behavioral: Negative for confusion and hallucinations  Objective:      Ht 5' 6" (1 676 m)   Wt (!) 147 kg (324 lb)   BMI 52 29 kg/m²          Physical Exam  Vitals and nursing note reviewed  Constitutional:       General: She is not in acute distress  Appearance: Normal appearance  She is not ill-appearing  HENT:      Head: Normocephalic  Right Ear: External ear normal  There is no impacted cerumen  Left Ear: External ear normal  There is no impacted cerumen  Nose: No congestion or rhinorrhea  Mouth/Throat:      Pharynx: No posterior oropharyngeal erythema  Eyes:      General: No scleral icterus  Right eye: No discharge  Left eye: No discharge  Neck:      Vascular: No carotid bruit  Cardiovascular:      Rate and Rhythm: Normal rate and regular rhythm  Heart sounds: Normal heart sounds  No murmur heard  No friction rub  No gallop  Pulmonary:      Breath sounds: No wheezing or rhonchi  Abdominal:      General: There is no distension  Tenderness: There is no abdominal tenderness  There is no guarding  Musculoskeletal:         General: No swelling  Cervical back: No rigidity  Right lower leg: No edema  Left lower leg: No edema  Lymphadenopathy:      Cervical: No cervical adenopathy  Skin:     Coloration: Skin is not jaundiced  Neurological:      Mental Status: She is alert  Cranial Nerves: No cranial nerve deficit  Motor: No weakness        Coordination: Coordination normal    Psychiatric:         Mood and Affect: Mood normal

## 2023-01-20 ENCOUNTER — HOSPITAL ENCOUNTER (OUTPATIENT)
Dept: RADIOLOGY | Facility: HOSPITAL | Age: 83
Discharge: HOME | End: 2023-01-20
Attending: PODIATRIST

## 2023-01-20 ENCOUNTER — APPOINTMENT (OUTPATIENT)
Dept: LAB | Facility: HOSPITAL | Age: 83
End: 2023-01-20
Attending: PODIATRIST

## 2023-01-20 DIAGNOSIS — Z01.818 OTHER SPECIFIED PRE-OPERATIVE EXAMINATION: ICD-10-CM

## 2023-01-20 LAB
ALBUMIN SERPL BCP-MCNC: 3.4 G/DL (ref 3.5–5)
ALP SERPL-CCNC: 85 U/L (ref 46–116)
ALT SERPL W P-5'-P-CCNC: 23 U/L (ref 12–78)
ANION GAP SERPL CALCULATED.3IONS-SCNC: 5 MMOL/L (ref 4–13)
AST SERPL W P-5'-P-CCNC: 23 U/L (ref 5–45)
BASOPHILS # BLD AUTO: 0.04 THOUSANDS/ÂΜL (ref 0–0.1)
BASOPHILS NFR BLD AUTO: 1 % (ref 0–1)
BILIRUB SERPL-MCNC: 0.44 MG/DL (ref 0.2–1)
BUN SERPL-MCNC: 17 MG/DL (ref 5–25)
CALCIUM ALBUM COR SERPL-MCNC: 10.2 MG/DL (ref 8.3–10.1)
CALCIUM SERPL-MCNC: 9.7 MG/DL (ref 8.3–10.1)
CHLORIDE SERPL-SCNC: 96 MMOL/L (ref 96–108)
CO2 SERPL-SCNC: 31 MMOL/L (ref 21–32)
CREAT SERPL-MCNC: 0.89 MG/DL (ref 0.6–1.3)
EOSINOPHIL # BLD AUTO: 0.18 THOUSAND/ÂΜL (ref 0–0.61)
EOSINOPHIL NFR BLD AUTO: 2 % (ref 0–6)
ERYTHROCYTE [DISTWIDTH] IN BLOOD BY AUTOMATED COUNT: 12.9 % (ref 11.6–15.1)
GFR SERPL CREATININE-BSD FRML MDRD: 60 ML/MIN/1.73SQ M
GLUCOSE P FAST SERPL-MCNC: 90 MG/DL (ref 65–99)
HCT VFR BLD AUTO: 39.1 % (ref 34.8–46.1)
HGB BLD-MCNC: 12.4 G/DL (ref 11.5–15.4)
IMM GRANULOCYTES # BLD AUTO: 0.04 THOUSAND/UL (ref 0–0.2)
IMM GRANULOCYTES NFR BLD AUTO: 1 % (ref 0–2)
LYMPHOCYTES # BLD AUTO: 2.15 THOUSANDS/ÂΜL (ref 0.6–4.47)
LYMPHOCYTES NFR BLD AUTO: 29 % (ref 14–44)
MCH RBC QN AUTO: 29.7 PG (ref 26.8–34.3)
MCHC RBC AUTO-ENTMCNC: 31.7 G/DL (ref 31.4–37.4)
MCV RBC AUTO: 94 FL (ref 82–98)
MONOCYTES # BLD AUTO: 0.97 THOUSAND/ÂΜL (ref 0.17–1.22)
MONOCYTES NFR BLD AUTO: 13 % (ref 4–12)
NEUTROPHILS # BLD AUTO: 4.17 THOUSANDS/ÂΜL (ref 1.85–7.62)
NEUTS SEG NFR BLD AUTO: 54 % (ref 43–75)
NRBC BLD AUTO-RTO: 0 /100 WBCS
PLATELET # BLD AUTO: 200 THOUSANDS/UL (ref 149–390)
PMV BLD AUTO: 9.9 FL (ref 8.9–12.7)
POTASSIUM SERPL-SCNC: 4.3 MMOL/L (ref 3.5–5.3)
PROT SERPL-MCNC: 7.5 G/DL (ref 6.4–8.4)
RBC # BLD AUTO: 4.18 MILLION/UL (ref 3.81–5.12)
SODIUM SERPL-SCNC: 132 MMOL/L (ref 135–147)
WBC # BLD AUTO: 7.55 THOUSAND/UL (ref 4.31–10.16)

## 2023-01-23 ENCOUNTER — ANESTHESIA EVENT (OUTPATIENT)
Dept: PERIOP | Facility: HOSPITAL | Age: 83
End: 2023-01-23

## 2023-01-23 NOTE — PRE-PROCEDURE INSTRUCTIONS
Pre-Surgery Instructions:   Medication Instructions   • amLODIPine (NORVASC) 5 mg tablet Take day of surgery  • aspirin 81 MG tablet Take night before surgery   • atorvastatin (LIPITOR) 20 mg tablet Take night before surgery   • Bioflavonoid Products (C COMPLEX PO) Hold day of surgery  • cholecalciferol (VITAMIN D3) 1,000 units tablet Hold day of surgery  • furosemide (LASIX) 40 mg tablet Hold day of surgery  • levothyroxine 137 mcg tablet Take day of surgery  • lisinopril (ZESTRIL) 40 mg tablet Hold day of surgery  • metoprolol succinate (TOPROL-XL) 25 mg 24 hr tablet Take day of surgery  • montelukast (SINGULAIR) 10 mg tablet Take night before surgery   • polyethylene glycol (GLYCOLAX) 17 GM/SCOOP powder Take night before surgery    You will receive a phone call from hospital for arrival time  Please call surgeons office if any changes in your condition  Wear easy on/off clothing; consider type of surgery;  Valuables, jewelry, piercing's please keep at home  **COVID-19  education/surgical guidelines  Updated covid    Visitation policy  Please: No contact lenses or eye make up, artificial eyelashes    Please secure transportation     Follow pre surgery showering or cleaning instructions as  Reviewed by nurse or surgeons office      Questions answered and concerns addressed

## 2023-01-26 ENCOUNTER — HOSPITAL ENCOUNTER (OUTPATIENT)
Facility: HOSPITAL | Age: 83
Setting detail: OUTPATIENT SURGERY
Discharge: HOME | End: 2023-01-26
Attending: PODIATRIST | Admitting: PODIATRIST

## 2023-01-26 ENCOUNTER — ANESTHESIA (OUTPATIENT)
Dept: PERIOP | Facility: HOSPITAL | Age: 83
End: 2023-01-26

## 2023-01-26 VITALS
OXYGEN SATURATION: 95 % | BODY MASS INDEX: 50.02 KG/M2 | HEIGHT: 64 IN | DIASTOLIC BLOOD PRESSURE: 67 MMHG | SYSTOLIC BLOOD PRESSURE: 170 MMHG | TEMPERATURE: 98.2 F | WEIGHT: 293 LBS | RESPIRATION RATE: 15 BRPM | HEART RATE: 58 BPM

## 2023-01-26 DIAGNOSIS — M67.472 GANGLION, LEFT ANKLE AND FOOT: ICD-10-CM

## 2023-01-26 DIAGNOSIS — G89.18 POST-OPERATIVE PAIN: Primary | ICD-10-CM

## 2023-01-26 RX ORDER — LIDOCAINE HYDROCHLORIDE 10 MG/ML
INJECTION, SOLUTION EPIDURAL; INFILTRATION; INTRACAUDAL; PERINEURAL AS NEEDED
Status: DISCONTINUED | OUTPATIENT
Start: 2023-01-26 | End: 2023-01-26

## 2023-01-26 RX ORDER — OXYCODONE HYDROCHLORIDE AND ACETAMINOPHEN 5; 325 MG/1; MG/1
1 TABLET ORAL EVERY 4 HOURS PRN
Status: DISCONTINUED | OUTPATIENT
Start: 2023-01-26 | End: 2023-01-26 | Stop reason: HOSPADM

## 2023-01-26 RX ORDER — FENTANYL CITRATE/PF 50 MCG/ML
25 SYRINGE (ML) INJECTION
Status: DISCONTINUED | OUTPATIENT
Start: 2023-01-26 | End: 2023-01-26 | Stop reason: HOSPADM

## 2023-01-26 RX ORDER — TRAMADOL HYDROCHLORIDE 300 MG/1
300 TABLET, EXTENDED RELEASE ORAL DAILY
Qty: 10 TABLET | Refills: 0 | Status: SHIPPED | OUTPATIENT
Start: 2023-01-26 | End: 2023-02-05

## 2023-01-26 RX ORDER — CEFAZOLIN SODIUM 1 G/50ML
1000 SOLUTION INTRAVENOUS ONCE
Status: COMPLETED | OUTPATIENT
Start: 2023-01-26 | End: 2023-01-26

## 2023-01-26 RX ORDER — PROPOFOL 10 MG/ML
INJECTION, EMULSION INTRAVENOUS CONTINUOUS PRN
Status: DISCONTINUED | OUTPATIENT
Start: 2023-01-26 | End: 2023-01-26

## 2023-01-26 RX ORDER — SODIUM CHLORIDE, SODIUM LACTATE, POTASSIUM CHLORIDE, CALCIUM CHLORIDE 600; 310; 30; 20 MG/100ML; MG/100ML; MG/100ML; MG/100ML
50 INJECTION, SOLUTION INTRAVENOUS CONTINUOUS
Status: DISCONTINUED | OUTPATIENT
Start: 2023-01-26 | End: 2023-01-26 | Stop reason: HOSPADM

## 2023-01-26 RX ORDER — ACETAMINOPHEN 325 MG/1
650 TABLET ORAL EVERY 4 HOURS PRN
Status: DISCONTINUED | OUTPATIENT
Start: 2023-01-26 | End: 2023-01-26 | Stop reason: HOSPADM

## 2023-01-26 RX ORDER — PROPOFOL 10 MG/ML
INJECTION, EMULSION INTRAVENOUS AS NEEDED
Status: DISCONTINUED | OUTPATIENT
Start: 2023-01-26 | End: 2023-01-26

## 2023-01-26 RX ORDER — ONDANSETRON 2 MG/ML
4 INJECTION INTRAMUSCULAR; INTRAVENOUS ONCE AS NEEDED
Status: DISCONTINUED | OUTPATIENT
Start: 2023-01-26 | End: 2023-01-26 | Stop reason: HOSPADM

## 2023-01-26 RX ORDER — ONDANSETRON 2 MG/ML
INJECTION INTRAMUSCULAR; INTRAVENOUS AS NEEDED
Status: DISCONTINUED | OUTPATIENT
Start: 2023-01-26 | End: 2023-01-26

## 2023-01-26 RX ORDER — CEFAZOLIN SODIUM 2 G/50ML
2000 SOLUTION INTRAVENOUS ONCE
Status: COMPLETED | OUTPATIENT
Start: 2023-01-26 | End: 2023-01-26

## 2023-01-26 RX ORDER — FENTANYL CITRATE 50 UG/ML
INJECTION, SOLUTION INTRAMUSCULAR; INTRAVENOUS AS NEEDED
Status: DISCONTINUED | OUTPATIENT
Start: 2023-01-26 | End: 2023-01-26

## 2023-01-26 RX ORDER — SODIUM CHLORIDE, SODIUM LACTATE, POTASSIUM CHLORIDE, CALCIUM CHLORIDE 600; 310; 30; 20 MG/100ML; MG/100ML; MG/100ML; MG/100ML
INJECTION, SOLUTION INTRAVENOUS CONTINUOUS PRN
Status: DISCONTINUED | OUTPATIENT
Start: 2023-01-26 | End: 2023-01-26

## 2023-01-26 RX ORDER — LIDOCAINE HYDROCHLORIDE 10 MG/ML
INJECTION, SOLUTION EPIDURAL; INFILTRATION; INTRACAUDAL; PERINEURAL AS NEEDED
Status: DISCONTINUED | OUTPATIENT
Start: 2023-01-26 | End: 2023-01-26 | Stop reason: HOSPADM

## 2023-01-26 RX ADMIN — PROPOFOL 40 MG: 10 INJECTION, EMULSION INTRAVENOUS at 07:37

## 2023-01-26 RX ADMIN — Medication 20 MG: at 07:37

## 2023-01-26 RX ADMIN — ONDANSETRON 4 MG: 2 INJECTION INTRAMUSCULAR; INTRAVENOUS at 07:35

## 2023-01-26 RX ADMIN — PROPOFOL 40 MCG/KG/MIN: 10 INJECTION, EMULSION INTRAVENOUS at 07:39

## 2023-01-26 RX ADMIN — CEFAZOLIN SODIUM 1000 MG: 1 SOLUTION INTRAVENOUS at 07:35

## 2023-01-26 RX ADMIN — LIDOCAINE HYDROCHLORIDE 50 MG: 10 INJECTION, SOLUTION EPIDURAL; INFILTRATION; INTRACAUDAL; PERINEURAL at 07:37

## 2023-01-26 RX ADMIN — FENTANYL CITRATE 25 MCG: 50 INJECTION INTRAMUSCULAR; INTRAVENOUS at 07:38

## 2023-01-26 RX ADMIN — FENTANYL CITRATE 25 MCG: 50 INJECTION INTRAMUSCULAR; INTRAVENOUS at 07:36

## 2023-01-26 RX ADMIN — SODIUM CHLORIDE, SODIUM LACTATE, POTASSIUM CHLORIDE, AND CALCIUM CHLORIDE: .6; .31; .03; .02 INJECTION, SOLUTION INTRAVENOUS at 07:25

## 2023-01-26 RX ADMIN — CEFAZOLIN SODIUM 2000 MG: 2 SOLUTION INTRAVENOUS at 07:35

## 2023-01-26 NOTE — INTERVAL H&P NOTE
H&P reviewed  After examining the patient I find no changes in the patients condition since the H&P had been written      Vitals:    01/26/23 0653   BP: (!) 206/80   Pulse:    Resp:    Temp:    SpO2:    Pt anxious currently, SBP is elevated

## 2023-01-26 NOTE — ANESTHESIA POSTPROCEDURE EVALUATION
Post-Op Assessment Note    CV Status:  Stable  Pain Score: 0    Pain management: adequate  Multimodal analgesia used between 6 hours prior to anesthesia start to PACU discharge    Mental Status:  Alert   Hydration Status:  Stable and euvolemic   PONV Controlled:  None   Airway Patency:  Patent   Two or more mitigation strategies used for obstructive sleep apnea   Post Op Vitals Reviewed: Yes      Staff: CRNA         No notable events documented      BP  162/67   Temp 98 2   Pulse 61   Resp 18   SpO2 95

## 2023-01-26 NOTE — ANESTHESIA PREPROCEDURE EVALUATION
Procedure:  REMOVAL GANGLION CYST/SOFT TISSUE MASS FROM LFT FOOT (Left: Finger)    Relevant Problems   CARDIO   (+) Aortic stenosis   (+) Essential (primary) hypertension   (+) Hyperlipidemia, unspecified   (+) S/P TAVR (transcatheter aortic valve replacement)      ENDO   (+) Hypothyroidism, unspecified   (+) Postoperative hypothyroidism      GI/HEPATIC   (+) Gastro-esophageal reflux disease without esophagitis      HEMATOLOGY   (+) Anemia      MUSCULOSKELETAL   (+) Chronic bilateral low back pain without sciatica   (+) Primary osteoarthritis of both knees   (+) Unilateral primary osteoarthritis, left hip      NEURO/PSYCH   (+) Anxiety   (+) Chronic bilateral low back pain without sciatica   (+) Generalized anxiety disorder   (+) History of thyroid nodule   (+) Major depressive disorder, single episode, unspecified      PULMONARY   (+) Asthma   (+) Moderate persistent asthma without complication   (+) Obstructive sleep apnea             Anesthesia Plan  ASA Score- 3     Anesthesia Type- IV sedation with anesthesia with ASA Monitors  Additional Monitors:   Airway Plan:           Plan Factors-    Chart reviewed  Patient summary reviewed  Patient is not a current smoker  Induction- intravenous  Postoperative Plan- Plan for postoperative opioid use  Informed Consent- Anesthetic plan and risks discussed with patient  I personally reviewed this patient with the CRNA  Discussed and agreed on the Anesthesia Plan with the CRNA  Rylan Saucedo           Procedure:  REMOVAL GANGLION CYST/SOFT TISSUE MASS FROM LFT FOOT (Left: Finger)    Relevant Problems   CARDIO   (+) Aortic stenosis   (+) Essential (primary) hypertension   (+) Hyperlipidemia, unspecified   (+) S/P TAVR (transcatheter aortic valve replacement)      ENDO   (+) Hypothyroidism, unspecified   (+) Postoperative hypothyroidism      GI/HEPATIC   (+) Gastro-esophageal reflux disease without esophagitis      HEMATOLOGY   (+) Anemia      MUSCULOSKELETAL (+) Chronic bilateral low back pain without sciatica   (+) Primary osteoarthritis of both knees   (+) Unilateral primary osteoarthritis, left hip      NEURO/PSYCH   (+) Anxiety   (+) Chronic bilateral low back pain without sciatica   (+) Generalized anxiety disorder   (+) History of thyroid nodule   (+) Major depressive disorder, single episode, unspecified      PULMONARY   (+) Asthma   (+) Moderate persistent asthma without complication   (+) Obstructive sleep apnea        Physical Exam    Airway    Mallampati score: III  TM Distance: >3 FB  Neck ROM: full     Dental       Cardiovascular      Pulmonary      Other Findings        Anesthesia Plan  ASA Score- 3     Anesthesia Type- IV sedation with anesthesia with ASA Monitors  Additional Monitors:   Airway Plan:           Plan Factors-    Chart reviewed  Patient summary reviewed  Patient is not a current smoker  Induction- intravenous  Postoperative Plan- Plan for postoperative opioid use  Informed Consent- Anesthetic plan and risks discussed with patient  I personally reviewed this patient with the CRNA  Discussed and agreed on the Anesthesia Plan with the CRNA  Jonah Nguyen rounded

## 2023-01-26 NOTE — DISCHARGE INSTR - AVS FIRST PAGE
Dr Libertad Stroud Instructions    1  Take your prescribed medication as directed  2  Upon arrival at home, lie down and elevate your surgical foot on 2 pillows  3  Stay off your feet as much as possible for the first 24-48 hours  This is when your feet first swell and may become painful  After 48 hours you may begin limited walking following these restrictions:      Weight bearing as tolerated in surgical shoe  4  Drink large quantities of water  Consume no alcohol  Continue a well-balanced diet  5  Report any unusual discomfort or fever to this office  6  A limited amount of discomfort and swelling is to be expected  In some cases the skin may take on a bruised appearance  The surgical cleansing solution that was applied to your foot prior to the operation is dark in color and the operation site may appear to be oozing when it actually is not  7  A slight amount of blood is to be expected, and is no cause for alarm  Do not remove the dressings  If there is active bleeding and if the bleeding persists, add additional gauze to the bandage, apply direct pressure, elevate your feet and call this office  8  Do not get the dressings wet  As regular bathing may be inconvenient, sponge baths are recommended  9  When anesthesia wears off and if any discomfort should be present, apply an ice pack directly over the operated area for 15 minute intervals for several hours or until the pain leaves  (USE IN EXCESS OF 15 MINUTES COULD CAUSE FROSTBITE)  Do not use hot water bags or electric pads  A convenient icepack can be made by placing ice cubes in a plastic bag and covering this with a towel  10  Take over-the-counter laxative for constipation, this is common with use of narcotic medications

## 2023-01-26 NOTE — OP NOTE
OPERATIVE REPORT - Podiatry  PATIENT NAME: Michele Holbrook    :  1940  MRN: [id number]  Pt Location:  OR ROOM 03    SURGERY DATE: 2023    Surgeon(s) and Role:     * David Hyman DPM - Primary     * Cam Oleary DPM - Assisting    Pre-op Diagnosis:  Ganglion, left ankle and foot [M67 472]    Post-Op Diagnosis Codes:     * Ganglion, left ankle and foot [M67 472]    Procedure(s) (LRB):  REMOVAL GANGLION CYST/SOFT TISSUE MASS FROM LFT FOOT (Left)    Specimen(s):  ID Type Source Tests Collected by Time Destination   1 : soft tissue mass 1 5x1x1  5 Tissue Soft Tissue, Other TISSUE EXAM David Hyman DPM 2023 [id number]        Estimated Blood Loss:   Minimal    Drains:  * No LDAs found *    Anesthesia Type:   IV Sedation with Anesthesia with 10 ml of 1% Lidocaine and 0 5% Bupivacaine in a 1:1 mixture    Hemostasis:  Pneumatic ankle tourniquet set at 250 mmHg for 13 mins  Direct compression, electrocautery    Materials:  * No implants in log *  3-0 Vicryl  3-0 nylon    Injectables:  None    Operative Findings:  Consistent with Diagnosis, Soft tissue mass was removed measuring 1 5 x 1 x 1 5 cm appearance consistent with ganglion, no stalk noted  Complications:   None    Procedure and Technique:     Under mild sedation, the patient was brought into the operating room and placed on the operating room table in the supine position  IV sedation was achieved by anesthesia team and a universal timeout was performed where all parties are in agreement of correct patient, correct procedure and correct site  A pneumatic tourniquet was then placed over the patient's left lower extremity with ample padding  A V block was performed consisting of 10 ml of local anesthetic  The foot was then prepped and draped in the usual aseptic manner  An esmarch bandage was used to exsangunate the foot and the pneumatic tourniquet was then inflated to 250 mmHg      Excision of soft tissue mass:  Attention was then directed to the left dorsal foot at area of soft tissue mass  A 3-1 elliptical incision was made excising mass with 15 blade  Soft tissue mass was removed with hemostats and scissors measuring 1 5 x 1 x 1 5 cm appearance consistent with ganglion, no stalk noted  Mass sent to pathology  The surgical incision was irrigated with copious amounts of normal sterile saline  Subcutaneous closure was obtained utilizing 3-0 vicryl  Skin edges were reapproximated and closure was obtained utilizing 3-0 vicryl  The foot was then cleansed and dried  The incision site was dressed with adaptic, gauze  This was then covered with a Lc and an ACE wrap  The tourniquet was deflated at approximately 13 min and normal hyperemic response was noted to all digits  The patient tolerated the procedure and anesthesia well without immediate complications and transferred to PACU with vital signs stable  Dr Shaka Buckley was present during the entire procedure and participated in all key aspects  SIGNATURE: Kendrick Alpers, DPM  DATE: January 26, 2023  TIME: 8:22 AM      Portions of the record may have been created with voice recognition software  Occasional wrong word or "sound a like" substitutions may have occurred due to the inherent limitations of voice recognition software  Read the chart carefully and recognize, using context, where substitutions have occurred

## 2023-04-07 LAB
T4 FREE SERPL-MCNC: 1.82 NG/DL (ref 0.82–1.77)
TSH SERPL DL<=0.005 MIU/L-ACNC: 1.53 UIU/ML (ref 0.45–4.5)

## 2023-04-08 DIAGNOSIS — I10 HYPERTENSION, UNSPECIFIED TYPE: ICD-10-CM

## 2023-04-08 RX ORDER — LISINOPRIL 40 MG/1
TABLET ORAL
Qty: 90 TABLET | Refills: 0 | Status: SHIPPED | OUTPATIENT
Start: 2023-04-08

## 2023-05-06 DIAGNOSIS — I10 HYPERTENSION, UNSPECIFIED TYPE: ICD-10-CM

## 2023-05-06 RX ORDER — LISINOPRIL 40 MG/1
TABLET ORAL
Qty: 90 TABLET | Refills: 0 | Status: SHIPPED | OUTPATIENT
Start: 2023-05-06

## 2023-06-27 DIAGNOSIS — I10 HYPERTENSION, UNSPECIFIED TYPE: ICD-10-CM

## 2023-06-27 RX ORDER — LISINOPRIL 40 MG/1
TABLET ORAL
Qty: 90 TABLET | Refills: 0 | Status: SHIPPED | OUTPATIENT
Start: 2023-06-27

## 2023-08-17 ENCOUNTER — OFFICE VISIT (OUTPATIENT)
Dept: INTERNAL MEDICINE CLINIC | Facility: CLINIC | Age: 83
End: 2023-08-17
Payer: COMMERCIAL

## 2023-08-17 VITALS
TEMPERATURE: 97.3 F | HEART RATE: 74 BPM | WEIGHT: 293 LBS | HEIGHT: 64 IN | BODY MASS INDEX: 50.02 KG/M2 | OXYGEN SATURATION: 92 %

## 2023-08-17 DIAGNOSIS — J01.01 ACUTE RECURRENT MAXILLARY SINUSITIS: Primary | ICD-10-CM

## 2023-08-17 DIAGNOSIS — E66.01 OBESITIES, MORBID (HCC): ICD-10-CM

## 2023-08-17 DIAGNOSIS — L03.119 CELLULITIS OF LOWER EXTREMITY, UNSPECIFIED LATERALITY: ICD-10-CM

## 2023-08-17 PROCEDURE — 99214 OFFICE O/P EST MOD 30 MIN: CPT | Performed by: INTERNAL MEDICINE

## 2023-08-17 RX ORDER — CEPHALEXIN 500 MG/1
500 CAPSULE ORAL EVERY 6 HOURS SCHEDULED
Qty: 40 CAPSULE | Refills: 0 | Status: SHIPPED | OUTPATIENT
Start: 2023-08-17 | End: 2023-08-27

## 2023-08-17 NOTE — PROGRESS NOTES
Assessment/Plan:    No problem-specific Assessment & Plan notes found for this encounter. There are no diagnoses linked to this encounter. Subjective:      Patient ID: So Kennedy is a 81 y.o. female. Sinus drip =and legd since hune on and odd    Stasis dermatitis and lichenification b/l   Drainage and red L leg    Wt 335      sao3 92"nl 93)  ? [place]e    [place]  No s3  Had echo dr Wanda Jones 3/2023-will be seeing next week          The following portions of the patient's history were reviewed and updated as appropriate: allergies, current medications, past family history, past medical history, past social history, past surgical history, and problem list.    Review of Systems   Constitutional: Negative for activity change, fatigue and fever. HENT: Positive for sinus pain. Negative for ear discharge, ear pain, rhinorrhea and sore throat. Eyes: Negative for pain and visual disturbance. Respiratory: Negative for cough and shortness of breath. Cardiovascular: Negative for chest pain and leg swelling. Gastrointestinal: Negative for abdominal pain, constipation and diarrhea. Endocrine: Negative for cold intolerance and polyuria. Genitourinary: Negative for flank pain and hematuria. Musculoskeletal: Negative for back pain and joint swelling. Skin: Positive for color change. Negative for pallor and wound. Neurological: Negative for dizziness, seizures and speech difficulty. Psychiatric/Behavioral: Negative for confusion and hallucinations. Objective: There were no vitals taken for this visit. Physical Exam  Vitals and nursing note reviewed. Constitutional:       General: She is not in acute distress. Appearance: Normal appearance. She is not ill-appearing. HENT:      Head: Normocephalic. Right Ear: External ear normal. There is no impacted cerumen. Left Ear: External ear normal. There is no impacted cerumen.       Nose: No congestion or rhinorrhea. Mouth/Throat:      Pharynx: No posterior oropharyngeal erythema. Eyes:      General: No scleral icterus. Right eye: No discharge. Left eye: No discharge. Neck:      Vascular: No carotid bruit. Cardiovascular:      Rate and Rhythm: Normal rate and regular rhythm. Heart sounds: Normal heart sounds. No murmur heard. No friction rub. No gallop. Pulmonary:      Breath sounds: No wheezing or rhonchi. Abdominal:      General: There is no distension. Tenderness: There is no abdominal tenderness. There is no guarding. Musculoskeletal:         General: No swelling. Cervical back: No rigidity. Right lower leg: No edema. Left lower leg: No edema. Lymphadenopathy:      Cervical: No cervical adenopathy. Skin:     Coloration: Skin is not jaundiced. Neurological:      Mental Status: She is alert. Cranial Nerves: No cranial nerve deficit. Motor: No weakness.       Coordination: Coordination normal.   Psychiatric:         Mood and Affect: Mood normal.

## 2023-09-20 DIAGNOSIS — Z86.39 HISTORY OF THYROID NODULE: ICD-10-CM

## 2023-09-20 DIAGNOSIS — E89.0 POSTOPERATIVE HYPOTHYROIDISM: ICD-10-CM

## 2023-09-20 DIAGNOSIS — I10 HYPERTENSION, UNSPECIFIED TYPE: ICD-10-CM

## 2023-09-20 RX ORDER — LISINOPRIL 40 MG/1
TABLET ORAL
Qty: 90 TABLET | Refills: 0 | Status: SHIPPED | OUTPATIENT
Start: 2023-09-20

## 2023-09-20 RX ORDER — LEVOTHYROXINE SODIUM 137 UG/1
TABLET ORAL
Qty: 90 TABLET | Refills: 3 | Status: SHIPPED | OUTPATIENT
Start: 2023-09-20

## 2023-10-16 PROBLEM — J01.01 ACUTE RECURRENT MAXILLARY SINUSITIS: Status: RESOLVED | Noted: 2022-03-08 | Resolved: 2023-10-16

## 2023-12-12 DIAGNOSIS — J45.40 MODERATE PERSISTENT ASTHMA WITHOUT COMPLICATION: ICD-10-CM

## 2023-12-12 RX ORDER — MONTELUKAST SODIUM 10 MG/1
TABLET ORAL
Qty: 90 TABLET | Refills: 3 | Status: SHIPPED | OUTPATIENT
Start: 2023-12-12

## 2024-02-01 ENCOUNTER — OFFICE VISIT (OUTPATIENT)
Dept: INTERNAL MEDICINE CLINIC | Facility: CLINIC | Age: 84
End: 2024-02-01
Payer: COMMERCIAL

## 2024-02-01 VITALS
TEMPERATURE: 97.5 F | HEIGHT: 64 IN | BODY MASS INDEX: 50.02 KG/M2 | WEIGHT: 293 LBS | SYSTOLIC BLOOD PRESSURE: 150 MMHG | HEART RATE: 94 BPM | DIASTOLIC BLOOD PRESSURE: 80 MMHG

## 2024-02-01 DIAGNOSIS — I10 ESSENTIAL (PRIMARY) HYPERTENSION: ICD-10-CM

## 2024-02-01 DIAGNOSIS — F43.9 STRESS: ICD-10-CM

## 2024-02-01 DIAGNOSIS — F13.10 ATIVAN USE DISORDER, MILD (HCC): ICD-10-CM

## 2024-02-01 DIAGNOSIS — J22 LOWER RESP. TRACT INFECTION: Primary | ICD-10-CM

## 2024-02-01 DIAGNOSIS — L03.119 CELLULITIS OF LOWER EXTREMITY, UNSPECIFIED LATERALITY: ICD-10-CM

## 2024-02-01 PROCEDURE — 99214 OFFICE O/P EST MOD 30 MIN: CPT | Performed by: INTERNAL MEDICINE

## 2024-02-01 RX ORDER — LORAZEPAM 0.5 MG/1
0.5 TABLET ORAL EVERY 12 HOURS PRN
Qty: 30 TABLET | Refills: 1 | Status: SHIPPED | OUTPATIENT
Start: 2024-02-01 | End: 2024-03-02

## 2024-02-01 RX ORDER — FUROSEMIDE 40 MG/1
40 TABLET ORAL 2 TIMES DAILY
Qty: 60 TABLET | Refills: 0 | Status: SHIPPED | OUTPATIENT
Start: 2024-02-01 | End: 2024-03-02

## 2024-02-01 RX ORDER — CEPHALEXIN 500 MG/1
500 CAPSULE ORAL EVERY 6 HOURS SCHEDULED
Qty: 40 CAPSULE | Refills: 0 | Status: SHIPPED | OUTPATIENT
Start: 2024-02-01 | End: 2024-02-11

## 2024-02-01 RX ORDER — POTASSIUM CHLORIDE 750 MG/1
10 TABLET, EXTENDED RELEASE ORAL DAILY
Qty: 30 TABLET | Refills: 0 | Status: SHIPPED | OUTPATIENT
Start: 2024-02-01 | End: 2024-03-02

## 2024-02-01 RX ORDER — NALOXONE HYDROCHLORIDE 4 MG/.1ML
SPRAY NASAL
Qty: 1 EACH | Refills: 1 | Status: SHIPPED | OUTPATIENT
Start: 2024-02-01 | End: 2025-01-31

## 2024-02-01 NOTE — PROGRESS NOTES
Depression Screening and Follow-up Plan: Patient was screened for depression during today's encounter. They screened negative with a PHQ-9 score of 2.        Assessment/Plan:    No problem-specific Assessment & Plan notes found for this encounter.       There are no diagnoses linked to this encounter.      Subjective:      Patient ID: Vania Marcus is a 83 y.o. female.    Day ill=10 d  Zyrtex  d  Clear  mucous  Stress nhl  son 1    overdose  son 2 -court    Legs  better  with abx  now  back     Refussed bp pills dr  stram cardioplogy        The following portions of the patient's history were reviewed and updated as appropriate: allergies, current medications, past family history, past medical history, past social history, past surgical history, and problem list.    Review of Systems   Constitutional:  Negative for activity change, fatigue and fever.   HENT:  Positive for sinus pressure. Negative for ear discharge, ear pain, rhinorrhea and sore throat.    Eyes:  Negative for pain and visual disturbance.   Respiratory:  Positive for cough. Negative for shortness of breath.    Cardiovascular:  Negative for chest pain and leg swelling.   Gastrointestinal:  Negative for abdominal pain, constipation and diarrhea.   Endocrine: Negative for cold intolerance and polyuria.   Genitourinary:  Negative for flank pain and hematuria.   Musculoskeletal:  Negative for back pain and joint swelling.   Skin:  Negative for pallor and wound.   Neurological:  Negative for dizziness, seizures and speech difficulty.   Psychiatric/Behavioral:  Negative for confusion and hallucinations.          Objective:      There were no vitals taken for this visit.         Physical Exam  Vitals and nursing note reviewed.   Constitutional:       General: She is not in acute distress.     Appearance: Normal appearance. She is not ill-appearing.   HENT:      Head: Normocephalic.      Right Ear: External ear normal. There is no impacted cerumen.       Left Ear: External ear normal. There is no impacted cerumen.      Nose: No congestion or rhinorrhea.      Mouth/Throat:      Pharynx: No posterior oropharyngeal erythema.   Eyes:      General: No scleral icterus.        Right eye: No discharge.         Left eye: No discharge.   Neck:      Vascular: No carotid bruit.   Cardiovascular:      Rate and Rhythm: Normal rate and regular rhythm.      Heart sounds: Normal heart sounds. No murmur heard.     No friction rub. No gallop.   Pulmonary:      Breath sounds: No wheezing or rhonchi.   Abdominal:      General: There is no distension.      Tenderness: There is no abdominal tenderness. There is no guarding.   Musculoskeletal:         General: No swelling.      Cervical back: No rigidity.      Right lower leg: No edema.      Left lower leg: No edema.   Lymphadenopathy:      Cervical: No cervical adenopathy.   Skin:     Coloration: Skin is not jaundiced.   Neurological:      Mental Status: She is alert.      Cranial Nerves: No cranial nerve deficit.      Motor: No weakness.      Coordination: Coordination normal.   Psychiatric:         Mood and Affect: Mood normal.

## 2024-02-16 DIAGNOSIS — I10 HYPERTENSION, UNSPECIFIED TYPE: ICD-10-CM

## 2024-02-16 RX ORDER — LISINOPRIL 40 MG/1
TABLET ORAL
Qty: 90 TABLET | Refills: 0 | Status: SHIPPED | OUTPATIENT
Start: 2024-02-16

## 2024-02-20 ENCOUNTER — TELEPHONE (OUTPATIENT)
Dept: INTERNAL MEDICINE CLINIC | Facility: CLINIC | Age: 84
End: 2024-02-20

## 2024-02-20 DIAGNOSIS — L03.119 CELLULITIS OF LOWER EXTREMITY, UNSPECIFIED LATERALITY: Primary | ICD-10-CM

## 2024-02-20 RX ORDER — CEPHALEXIN 500 MG/1
500 CAPSULE ORAL EVERY 6 HOURS SCHEDULED
Qty: 32 CAPSULE | Refills: 0 | Status: SHIPPED | OUTPATIENT
Start: 2024-02-20 | End: 2024-02-28

## 2024-02-20 NOTE — TELEPHONE ENCOUNTER
Patient called, she was in office on 2/1 given cephalexin and furosemide for cellulitis on leg, the swelling in ankles has improved, the redness on leg is a little better but still red and sometimes weepy, can you call in more antibotics

## 2024-02-22 LAB
ALBUMIN SERPL-MCNC: 4.1 G/DL (ref 3.7–4.7)
BUN SERPL-MCNC: 24 MG/DL (ref 8–27)
BUN/CREAT SERPL: 24 (ref 12–28)
CALCIUM SERPL-MCNC: 9.3 MG/DL (ref 8.7–10.3)
CHLORIDE SERPL-SCNC: 93 MMOL/L (ref 96–106)
CO2 SERPL-SCNC: 26 MMOL/L (ref 20–29)
CREAT SERPL-MCNC: 0.99 MG/DL (ref 0.57–1)
EGFR: 57 ML/MIN/1.73
GLUCOSE SERPL-MCNC: 102 MG/DL (ref 70–99)
PHOSPHATE SERPL-MCNC: 3.9 MG/DL (ref 3–4.3)
POTASSIUM SERPL-SCNC: 4.2 MMOL/L (ref 3.5–5.2)
SODIUM SERPL-SCNC: 138 MMOL/L (ref 134–144)

## 2024-02-23 DIAGNOSIS — I10 ESSENTIAL (PRIMARY) HYPERTENSION: ICD-10-CM

## 2024-02-23 RX ORDER — POTASSIUM CHLORIDE 750 MG/1
10 TABLET, EXTENDED RELEASE ORAL DAILY
Qty: 90 TABLET | Refills: 1 | Status: SHIPPED | OUTPATIENT
Start: 2024-02-23

## 2024-02-23 RX ORDER — FUROSEMIDE 40 MG/1
40 TABLET ORAL 2 TIMES DAILY
Qty: 180 TABLET | Refills: 1 | Status: SHIPPED | OUTPATIENT
Start: 2024-02-23

## 2024-04-17 ENCOUNTER — TELEPHONE (OUTPATIENT)
Dept: INTERNAL MEDICINE CLINIC | Facility: CLINIC | Age: 84
End: 2024-04-17

## 2024-04-17 NOTE — TELEPHONE ENCOUNTER
[facility] Lymphedema physical therapist called, patient is with her now, patient has cellulitis and wanted dr sparks to call in antibotics, I informed patient to go to urgent care or come see Dr. Sparks tomorrow.

## 2024-04-30 LAB
T4 FREE SERPL-MCNC: 1.85 NG/DL (ref 0.82–1.77)
TSH SERPL DL<=0.005 MIU/L-ACNC: 4.14 UIU/ML (ref 0.45–4.5)

## 2024-05-21 ENCOUNTER — TELEPHONE (OUTPATIENT)
Dept: INTERNAL MEDICINE CLINIC | Facility: CLINIC | Age: 84
End: 2024-05-21

## 2024-05-21 ENCOUNTER — OFFICE VISIT (OUTPATIENT)
Dept: ENDOCRINOLOGY | Facility: HOSPITAL | Age: 84
End: 2024-05-21
Payer: COMMERCIAL

## 2024-05-21 VITALS
WEIGHT: 293 LBS | HEART RATE: 103 BPM | DIASTOLIC BLOOD PRESSURE: 60 MMHG | BODY MASS INDEX: 50.02 KG/M2 | SYSTOLIC BLOOD PRESSURE: 128 MMHG | HEIGHT: 64 IN | OXYGEN SATURATION: 92 %

## 2024-05-21 DIAGNOSIS — E89.0 POSTOPERATIVE HYPOTHYROIDISM: Primary | ICD-10-CM

## 2024-05-21 DIAGNOSIS — Z86.39 HISTORY OF THYROID NODULE: ICD-10-CM

## 2024-05-21 PROCEDURE — 99214 OFFICE O/P EST MOD 30 MIN: CPT | Performed by: INTERNAL MEDICINE

## 2024-05-21 RX ORDER — LEVOTHYROXINE SODIUM 137 UG/1
137 TABLET ORAL DAILY
Qty: 90 TABLET | Refills: 3 | Status: SHIPPED | OUTPATIENT
Start: 2024-05-21

## 2024-05-21 RX ORDER — APIXABAN 5 MG/1
TABLET, FILM COATED ORAL
COMMUNITY

## 2024-05-21 RX ORDER — AMIODARONE HYDROCHLORIDE 200 MG/1
200 TABLET ORAL DAILY
COMMUNITY

## 2024-05-21 NOTE — PATIENT INSTRUCTIONS
The thyroid is a bit on the underactive side with 1 number and the overactive side with another number but overall normal.     We'll follow over time.     Continue the same levothyroxine.     Follow up in 1 year with blood work.     We'll check blood work in 6 months given you are on amiodarone.

## 2024-05-21 NOTE — PROGRESS NOTES
5/21/2024    Assessment & Plan      Diagnoses and all orders for this visit:    Postoperative hypothyroidism  -     T4, free; Future  -     TSH, 3rd generation; Future  -     T4, free  -     TSH, 3rd generation  -     T4, free; Future  -     TSH, 3rd generation; Future  -     T4, free  -     TSH, 3rd generation  -     levothyroxine 137 mcg tablet; Take 1 tablet (137 mcg total) by mouth daily    Body mass index (BMI) 50.0-59.9, adult (HCC)    History of thyroid nodule  -     levothyroxine 137 mcg tablet; Take 1 tablet (137 mcg total) by mouth daily    Other orders  -     amiodarone 200 mg tablet; Take 200 mg by mouth daily  -     Eliquis 5 MG; TAKE 1 TABLET BY MOUTH TWICE A DAY Oral for 90 Days        Assessment & Plan  1.  Hypothyroidism post thyroidectomy.  The patient's most recent thyroid function studies have returned normal results. However, she has a high normal TSH and a slightly elevated free T4. The slightly elevated free T4 is likely attributable to her thyroid medication intake prior to the blood work. Currently, she is biochemically euthyroid. The patient will maintain her current regimen of levothyroxine 137 mcg daily. She is also on amiodarone, hence, her thyroid hormone requirements will be closely monitored.     A repeat TSH and free T4 test will be conducted in 6 months.    2. Lymphedema, with a potential infection.  The patient's odor is consistent with an infection due to the lymphedema. I strongly recommend that she seek immediate evaluation at an urgent care or an emergency room, even if the wraps are removed as these need to be evaluated and treated appropriately. If the bacterial infection is not confirmed, I would not prescribe antibiotics but I am unsure what antibiotics would need to be ordered so I would leave that up to her primary care physician or urgent care. I have informed her that she may require a hospital admission if the infection is not treated.    Follow-up  The patient is  scheduled for a follow-up visit in 1 year, at which time a preceding TSH and free T4 test will be conducted.    I have spent a total time of 30 minutes on 05/21/24 in caring for this patient including Diagnostic results, Prognosis, Risks and benefits of tx options, Instructions for management, Patient and family education, Importance of tx compliance, Risk factor reductions, Impressions, Counseling / Coordination of care, Documenting in the medical record, Reviewing / ordering tests, medicine, procedures  , and Obtaining or reviewing history  .      CC: Hypothyroid follow-up    History of Present Illness    HPI: Vania Marcus is an 83-year-old female with a history of hypothyroidism, post subtotal thyroidectomy, who is here for a follow-up visit.    She was found to have a very large goiter and thyroid nodules in 2008.  Since she was having a choking sensation in her throat when she bent her neck fall word, surgery was performed.  Previous thyroid nodule biopsies have been benign.  Subtotal thyroidectomy was performed in April 2008 due to the continued growth of the nodules and compressive thyroid symptoms.  Pathology was benign.  She is on thyroid hormone for replacement purposes.     The patient is currently on a daily regimen of levothyroxine 137 mcg. She reports experiencing frequent coldness but denies experiencing excessive perspiration. Despite not experiencing tachycardia or palpitations, she acknowledges persistent atrial fibrillation. Her cardiologist, Dr. Street, has expressed dissatisfaction with the lack of clarity regarding the patient's persistent atrial fibrillation. A recent attempt to cardiovert her four times yesterday yielded unsuccessful results, necessitating an ablation procedure. She denies experiencing chest pain but reports severe dyspnea, which has been progressively worsening. Dr. Street has not prescribed any prescription medications. She also reports intermittent hand tremors and dry skin  on her legs.    The patient has been diagnosed with lymphedema in her legs, which has resulted in sores. She is unsure if the sores are currently draining pus, but notes an unpleasant odor. The lymphedema wrap was applied on Friday and has since improved. She reports poor nail growth, mild hair loss, and constipation. She experienced a weight loss of 9 pounds, as per her cardiologist's advice, from 03/2023 until the other week, but has since regained the weight. She denies any diplopia.    The patient was diagnosed with sleep apnea last Tuesday night, during which she stopped breathing five times per minute. She was advised to visit the sleep center to obtain a CPAP machine, pending insurance approval. She reports daytime fatigue but denies experiencing anxiety or depression. However, she occasionally experiences anxiety when unable to fall asleep quickly, for which she requests a medication. She denies dysphagia but reports mucus in her throat at night, which disrupts her sleep.        Historical Information   Past Medical History:   Diagnosis Date    Aortic stenosis     Asthma     COVID-19 04/2022    GERD (gastroesophageal reflux disease)     Heart murmur     History of transfusion     w/ valve surgery -no reaction    Hyperlipidemia     Hypertension     Osteoarthritis     Shortness of breath     Spinal stenosis      Past Surgical History:   Procedure Laterality Date    AORTIC VALVE REPLACEMENT  04/2022    [facility]    HYSTERECTOMY      LAPAROSCOPIC CHOLECYSTECTOMY      CO EXCISION GANGLION WRIST DORSAL/VOLAR PRIMARY Left 1/26/2023    Procedure: REMOVAL GANGLION CYST/SOFT TISSUE MASS FROM LFT FOOT;  Surgeon: Carl Perkins DPM;  Location:  MAIN OR;  Service: Podiatry    REDUCTION MAMMAPLASTY      breast reduction    REPLACEMENT TOTAL KNEE BILATERAL Bilateral     THYROID SURGERY      goiter removal    THYROIDECTOMY  2008    TOTAL ABDOMINAL HYSTERECTOMY W/ BILATERAL SALPINGOOPHORECTOMY      with  appendectomy    TOTAL HIP ARTHROPLASTY Bilateral     TUBAL LIGATION Bilateral      Social History   Social History     Substance and Sexual Activity   Alcohol Use Not Currently    Comment: once a year     Social History     Substance and Sexual Activity   Drug Use Never    Comment: CBD oil and hemp PRn      Social History     Tobacco Use   Smoking Status Never    Passive exposure: Never   Smokeless Tobacco Never     Family History:   Family History   Problem Relation Age of Onset    Heart disease Mother     Diabetes type II Mother     Hypotension Father     Lung disease Father         from chemicals    Diabetes type II Brother     Kidney failure Brother     Lung cancer Daughter     Brain cancer Daughter     Kidney cancer Daughter     Thyroid disease unspecified Daughter     Lymphoma Son     Neurological problems Son     Anxiety disorder Son        Meds/Allergies   Current Outpatient Medications   Medication Sig Dispense Refill    amiodarone 200 mg tablet Take 200 mg by mouth daily      amLODIPine (NORVASC) 5 mg tablet Take 5 mg by mouth 2 (two) times a day      aspirin 81 MG tablet Take 81 mg by mouth daily      atorvastatin (LIPITOR) 20 mg tablet Take 20 mg by mouth daily at bedtime   3    Bioflavonoid Products (C COMPLEX PO) Take 500 mg by mouth daily      cholecalciferol (VITAMIN D3) 1,000 units tablet Take 1,000 Units by mouth daily      Eliquis 5 MG TAKE 1 TABLET BY MOUTH TWICE A DAY Oral for 90 Days      furosemide (LASIX) 40 mg tablet TAKE 1 TABLET BY MOUTH TWICE A  tablet 1    Klor-Con M10 10 MEQ tablet TAKE 1 TABLET BY MOUTH EVERY DAY 90 tablet 1    levothyroxine 137 mcg tablet Take 1 tablet (137 mcg total) by mouth daily 90 tablet 3    lisinopril (ZESTRIL) 40 mg tablet TAKE 1 TABLET BY MOUTH EVERY DAY 90 tablet 0    metoprolol succinate (TOPROL-XL) 25 mg 24 hr tablet Take 25 mg by mouth 2 (two) times a day      montelukast (SINGULAIR) 10 mg tablet TAKE 1 TABLET BY MOUTH EVERY DAY 90 tablet 3     "naloxone (NARCAN) 4 mg/0.1 mL nasal spray Administer 1 spray into a nostril. If no response after 2-3 minutes, give another dose in the other nostril using a new spray. 1 each 1    Omega-3 Fatty Acids (Fish Oil) 1200 MG CAPS Take by mouth Res q 1250 mg 2 twice a day.      Oxygen-Helium (RES-Q-AIR IN) Inhale      polyethylene glycol (GLYCOLAX) 17 GM/SCOOP powder Take by mouth daily as needed      traMADol (ULTRAM) 50 mg tablet Take 50 mg by mouth every 6 (six) hours as needed for moderate pain      LORazepam (Ativan) 0.5 mg tablet Take 1 tablet (0.5 mg total) by mouth every 12 (twelve) hours as needed for anxiety 30 tablet 1     No current facility-administered medications for this visit.     Allergies   Allergen Reactions    Pollen Extract Nasal Congestion       Objective   Vitals: Blood pressure 128/60, pulse 103, height 5' 4\" (1.626 m), weight (!) 150 kg (331 lb 6.4 oz), SpO2 92%.  Invasive Devices       None                   Physical Exam  Physical exam normal with pertinent positives and negatives.    No lid lag, stare, proptosis, or periorbital edema in the eyes.  Healed anterior scar on the neck. No palpable thyroid tissue in the neck.  Lungs are clear to auscultation.  Heart rhythm is irregularly irregular. No murmurs detected.  Tremor noted in the outstretched hands. Significant lymphedema in the musculoskeletal system is wrapped. Patellar deep tendon reflexes in the neurologic system were not performed due to lymphedema.      The history was obtained from the review of the chart and from the patient.    Lab Results:          Lab Results   Component Value Date    CREATININE 0.99 02/21/2024    CREATININE 0.89 01/20/2023    CREATININE 0.92 07/28/2022    BUN 24 02/21/2024    K 4.2 02/21/2024    CL 93 (L) 02/21/2024    CO2 26 02/21/2024     eGFR   Date Value Ref Range Status   02/21/2024 57 (L) >59 mL/min/1.73 Final   01/20/2023 60 ml/min/1.73sq m Final         Lab Results   Component Value Date    HDL 70 " 07/06/2022    TRIG 92 07/06/2022       Lab Results   Component Value Date    ALT 23 01/20/2023    AST 23 01/20/2023    ALKPHOS 85 01/20/2023       Lab Results   Component Value Date    TSH 4.140 04/29/2024    FREET4 1.85 (H) 04/29/2024             Future Appointments   Date Time Provider Department Center   5/21/2025  2:00 PM Besys Awad MD [facility]

## 2024-05-24 NOTE — TELEPHONE ENCOUNTER
05/24/24 7:17 AM        The office's request has been received, reviewed, and the patient chart updated. The PCP has successfully been removed with a patient attribution note. This message will now be completed.        Thank you  Shelly Chapman

## 2024-07-19 ENCOUNTER — TELEPHONE (OUTPATIENT)
Age: 84
End: 2024-07-19

## 2024-07-19 NOTE — TELEPHONE ENCOUNTER
Patients Daughter Agustín, had called in on behalf of her mother, Vania.     Agustín is the patients POA, as well is listed on the communication consent form that has been signed off by the patient on 5-.     Agustín had called in just wanting to get some information on patients medical history, patient had started paper work a while back to transition to a new provider as she wanted someone closer to [place], before that could happen and patient to get established with a new doctor, patient ended up in the hospital.     Agustín had a few questions in regards to the patients medication. Agustín had noticed that Dr. Sparks had prescribed the patient a nasal spray, which was basically a narcan. Agustín was curious on why that would be confused, as the patient wasn't seeing any drugs that had been giving to her, to need that medication.       Agustín was also curious if she can get a run through of what Dr. Sparks has been evaluating the patient for, what exactly was being diagnosed or discussed.     Please have someone advise back to agustín with additional information about these questions, as she does have a bit more.

## 2024-07-22 PROBLEM — I48.20 CHRONIC ATRIAL FIBRILLATION (HCC): Status: ACTIVE | Noted: 2024-07-22

## 2025-05-11 LAB
T4 FREE SERPL-MCNC: 2.05 NG/DL (ref 0.82–1.77)
TSH SERPL DL<=0.005 MIU/L-ACNC: 1.02 UIU/ML (ref 0.45–4.5)

## 2025-05-12 ENCOUNTER — RESULTS FOLLOW-UP (OUTPATIENT)
Dept: ENDOCRINOLOGY | Facility: HOSPITAL | Age: 85
End: 2025-05-12

## 2025-05-21 ENCOUNTER — OFFICE VISIT (OUTPATIENT)
Dept: ENDOCRINOLOGY | Facility: HOSPITAL | Age: 85
End: 2025-05-21

## 2025-05-21 VITALS
BODY MASS INDEX: 48.65 KG/M2 | DIASTOLIC BLOOD PRESSURE: 64 MMHG | HEART RATE: 88 BPM | SYSTOLIC BLOOD PRESSURE: 122 MMHG | OXYGEN SATURATION: 92 % | WEIGHT: 285 LBS | HEIGHT: 64 IN

## 2025-05-21 DIAGNOSIS — E89.0 POSTOPERATIVE HYPOTHYROIDISM: Primary | ICD-10-CM

## 2025-05-21 DIAGNOSIS — I50.32 CHRONIC DIASTOLIC CONGESTIVE HEART FAILURE (HCC): ICD-10-CM

## 2025-05-21 DIAGNOSIS — N18.32 STAGE 3B CHRONIC KIDNEY DISEASE (HCC): ICD-10-CM

## 2025-05-21 RX ORDER — METOLAZONE 5 MG/1
TABLET ORAL
COMMUNITY
Start: 2025-03-07

## 2025-05-21 RX ORDER — FUROSEMIDE 20 MG/1
20 TABLET ORAL 3 TIMES DAILY
COMMUNITY

## 2025-05-21 RX ORDER — SPIRONOLACTONE 25 MG/1
25 TABLET ORAL DAILY
COMMUNITY

## 2025-05-21 NOTE — ASSESSMENT & PLAN NOTE
Wt Readings from Last 3 Encounters:   05/21/25 129 kg (285 lb)   05/21/24 (!) 150 kg (331 lb 6.4 oz)   02/01/24 (!) 153 kg (337 lb)

## 2025-05-21 NOTE — PATIENT INSTRUCTIONS
The thyroid blood work is ok.     Continue the same levothyroxine 137 mcg daily.     Follow up in 1 year with caitlin blood.
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
## 2025-05-21 NOTE — ASSESSMENT & PLAN NOTE
Lab Results   Component Value Date    EGFR 45 (L) 07/15/2024    EGFR 57 (L) 02/21/2024    EGFR 60 01/20/2023    CREATININE 1.19 (H) 07/15/2024    CREATININE 0.99 02/21/2024    CREATININE 0.89 01/20/2023

## 2025-07-23 ENCOUNTER — DOCUMENTATION (OUTPATIENT)
Dept: ADMINISTRATIVE | Facility: OTHER | Age: 85
End: 2025-07-23

## 2025-07-23 NOTE — PROGRESS NOTES
07/23/25 4:01 PM    Annual Wellness Visit outreach is not required, patient has an upcoming appointment with the PCP office.    Thank you.  Lise Nelson  [facility]

## 2025-07-30 ENCOUNTER — RESULTS FOLLOW-UP (OUTPATIENT)
Age: 85
End: 2025-07-30

## 2025-07-30 LAB
BUN SERPL-MCNC: 33 MG/DL (ref 8–27)
BUN/CREAT SERPL: 22 (ref 12–28)
CALCIUM SERPL-MCNC: 11.3 MG/DL (ref 8.7–10.3)
CHLORIDE SERPL-SCNC: 91 MMOL/L (ref 96–106)
CO2 SERPL-SCNC: 26 MMOL/L (ref 20–29)
CREAT SERPL-MCNC: 1.47 MG/DL (ref 0.57–1)
EGFR: 35 ML/MIN/1.73
GLUCOSE SERPL-MCNC: 137 MG/DL (ref 70–99)
MAGNESIUM SERPL-MCNC: 1.7 MG/DL (ref 1.6–2.3)
POTASSIUM SERPL-SCNC: 4.1 MMOL/L (ref 3.5–5.2)
SODIUM SERPL-SCNC: 137 MMOL/L (ref 134–144)

## (undated) DEVICE — GAUZE SPONGES,16 PLY: Brand: CURITY

## (undated) DEVICE — KERLIX BANDAGE ROLL: Brand: KERLIX

## (undated) DEVICE — CURITY NON-ADHERENT STRIPS: Brand: CURITY

## (undated) DEVICE — ACE WRAP 6 IN UNSTERILE

## (undated) DEVICE — ACE WRAP 4 IN UNSTERILE